# Patient Record
Sex: FEMALE | Race: WHITE
[De-identification: names, ages, dates, MRNs, and addresses within clinical notes are randomized per-mention and may not be internally consistent; named-entity substitution may affect disease eponyms.]

---

## 2019-02-01 PROBLEM — Z00.00 ENCOUNTER FOR PREVENTIVE HEALTH EXAMINATION: Status: ACTIVE | Noted: 2019-02-01

## 2019-02-06 ENCOUNTER — APPOINTMENT (OUTPATIENT)
Dept: ANTEPARTUM | Facility: CLINIC | Age: 44
End: 2019-02-06

## 2019-02-06 ENCOUNTER — OUTPATIENT (OUTPATIENT)
Dept: OUTPATIENT SERVICES | Facility: HOSPITAL | Age: 44
LOS: 1 days | Discharge: HOME | End: 2019-02-06

## 2019-02-06 VITALS
HEART RATE: 92 BPM | OXYGEN SATURATION: 97 % | DIASTOLIC BLOOD PRESSURE: 71 MMHG | SYSTOLIC BLOOD PRESSURE: 140 MMHG | HEIGHT: 68 IN | BODY MASS INDEX: 28.04 KG/M2 | TEMPERATURE: 98.3 F | WEIGHT: 185 LBS

## 2019-02-06 DIAGNOSIS — Z82.5 FAMILY HISTORY OF ASTHMA AND OTHER CHRONIC LOWER RESPIRATORY DISEASES: ICD-10-CM

## 2019-02-06 DIAGNOSIS — Z78.9 OTHER SPECIFIED HEALTH STATUS: ICD-10-CM

## 2019-02-06 RX ORDER — CHLORHEXIDINE GLUCONATE 4 %
400 LIQUID (ML) TOPICAL
Refills: 0 | Status: ACTIVE | COMMUNITY

## 2019-02-06 NOTE — DISCUSSION/SUMMARY
[FreeTextEntry1] : Ms. Galaviz is a 43 year old  @ 14 weeks 2 days, donor egg pregnancy with an abnormal DOwn Syndrome screen (1:12) on the first part of the Sequential screen) and NIPS low risk for common aneuploidies.\par \par Please note this consult only addresses this risk above and does not address any other genetic issues such as advanced paternal age or expanded carrier screening (they can be addressed by a genetic counselor if desired).\par \par We discussed genes and chromosomes.  We discussed some of the findings of Down Syndrome, including but not limited to intellectual disability, obesity, heart defects, unique facial features.  We also briefly discussed trisomy 18 and trisomy 13.  These babies are usually sicker than babies with Down syndrome, have intellectual disability, difficulty breathing, and congenital defects.  Most babies with these two trisomies will not live beyond a year of life.  Some will die within a week, and some die in utero. \par \par  We discussed the risk of aneuploidy from the maternal aspect is that of the donor, not Ms. Galaviz herself.  We discussed the Sequential Screen.  The sequential screens takes the patient's age, the thickness behind the skin of the baby's neck, and levels of protein in the blood done between 11 - 14 weeks gestation, and combines them with other levels of specific proteins in the blood obtained during the early part of the second trimester.  She receives a risk of the baby having Down syndrome or trisomy 18.  This is a screening test (does not say yes or no with 100% certainty) and the results may be wrong.  A positive result does not mean the the baby has Down syndrome or Trisomy 18 and a negative result does not exclude these conditions with 100% certainty.\par \par We discussed cell free fetal DNA screening, which is also a blood test that looks for the fetal DNA in the maternal circulation.  (I confirmed with [employer] that their NIPS can be used with donor egg pregnancies.) This test is more accurate than the sequential screen above when screening for Down syndrome, Trisomy 18, or Trisomy 13.  It can tell the genetic sex of the baby and also screens for abnormalities in the sex chromosomes.  Here too, however, this is a screening test only.  A positive result does not mean the baby for sure has the disease and a negative result does not guarantee that the baby does not have the disease, though it is highly unlikely that the baby has the disease.   If a patient has cell free fetal DNA screening for common aneuploidies we recommend that the patient have maternal serum alpha fetoprotein drawn ideally between 16 - 18 weeks to screen for open neural tube defects.\par \par We discussed the discrepancy between these two tests.  We discussed that the positive predictive value of the first part of the Sequential Screen is < 5% for someone less than 35 years of age. We discussed that the negative predictive value for Down syndrome of low risk NIPS is > 99%.  For Down syndrome screening, NIPS is more accurate than the Sequential screen.  Occasionally a Sequential screen is positive for Down syndrome, not because the baby has Down syndrome, but because the baby has another type of aneuploidy.  \par \par We discussed that while the NIPS results are overall reassuring, there is still a risk of a false negative, and the NIPS only looks at a few chromosomal imbalances.   We discussed that the only way to know the number of chromosomes a baby has is to do an amniocentesis (or a CVS).  There is a risk of miscarriage of around 1/400 from an amniocentesis even if the baby does not have a chromosomal imbalance.  There are other risks as well, including bleeding, infection, leakage of fluid.  We discussed that if a patient will consider termination of a pregnancy if the baby would be diagnosed with these condition (or any other chromosomal abnormality) or wanted to know the chromosomal makeup of the baby with almost 100% certainty, then amniocentesis should be considered.   We discussed that a normal amniocentesis does not guarantee the birth of a healthy baby.  She will let Dr. Tovar know if she wants an amniocentesis.  She knows that is usually is not done before 15 - 16 weeks gestation but she should make a decision within the next few weeks because it takes a few weeks to get results from the amniocentesis. \par \par In any event we recommend a fetal echocardiogram be performed in all patients who have IVF.  Daily low dose aspirin can be considered to reduce the risk of aneuploidy (she is 43 years of age, and at least today her blood pressure was elevated.  The blood pressure should be monitored.)\par \par The obstetric risks of advanced maternal age were discussed, including but not limited to the increased risks gestational hypertension, preeclampsia, gestational diabetes,  labor,  morbidity, placental complications such as abruption and previa, delivery via  and dysfunctional labor, and other maternal morbidity/mortality. \par \par Prenatal care is with Dr. Tovar.\par \par Ms. Galaviz and her partner expressed understanding and all of their questions were answered to their satisfaction. Thank you for this consult.\par \par Marko Meneses MD\par \par \par \par \par \par \par \par \par

## 2019-02-06 NOTE — HISTORY OF PRESENT ILLNESS
[FreeTextEntry1] : Ms. Galaviz is a 43 year old  @ 14 weeks 2 days (assuming a day 5 transfer, date of transfer was 2018. ) This is a donor egg pregnancy (donor was born in ), performed at [facility].  The sperm is of Ms. Galaviz's partner, who is 48 years old She is unsure whether preimplantation genetic screening was performed.\par \par She tried getting pregnant for two years prior to proceeding with the donor egg pregnancy. \par \par She has had no issues this pregnancy except for mild nausea.\par \par Ms. Galaviz had noninvasive prenatal screening done which was ,low risk for common aneuploidies.  However, she also had the first part of the Sequential screen, which reveal a risk of Down syndrome of 1:12. She was therefore referred by Dr. Tovar for consultation.  The patient's primary language is Egyptian.  However she declined the use of the  phone and she said she understood what I said. [Patient travelled to an area with active Zika Virus transmission during pregnancy or 8 weeks before getting pregnant] : Patient stated she did not travel to an area with active Zika virus transmission during pregnancy or 8 weeks before getting pregnant [Patient had sex without a condom with a man who traveled to, or reside in, an area with active Zika Virus transmission during pregnancy] : Patient did not have sex without a condom with a man who traveled to, or reside in, an area with active Zika Virus transmission during pregnancy

## 2019-02-06 NOTE — PHYSICAL EXAM
[Examination Of The Lungs] : normal [Examination Of The Cardiovascular System] : normal [Examination Of The Abdomen] : normal [Peripheral Vascular Exam] : normal [FreeTextEntry1] : Declined to give a urine sample.

## 2019-02-06 NOTE — SURGICAL HISTORY
[Breast Disease] : breast disease [Infertility] : infertility [Fibroids] : no fibroids [Abn Paps] : no abnormal pap smears [STI's] : no STI's [Cysts] : no cysts [OC Use] : no OC use [de-identified] : breast cyst (already evaluated per the patient)

## 2019-03-22 ENCOUNTER — OUTPATIENT (OUTPATIENT)
Dept: OUTPATIENT SERVICES | Facility: HOSPITAL | Age: 44
LOS: 1 days | Discharge: HOME | End: 2019-03-22

## 2019-03-22 ENCOUNTER — APPOINTMENT (OUTPATIENT)
Dept: ANTEPARTUM | Facility: CLINIC | Age: 44
End: 2019-03-22

## 2019-06-14 ENCOUNTER — INPATIENT (INPATIENT)
Facility: HOSPITAL | Age: 44
LOS: 10 days | Discharge: HOME | End: 2019-06-25
Attending: OBSTETRICS & GYNECOLOGY | Admitting: OBSTETRICS & GYNECOLOGY
Payer: COMMERCIAL

## 2019-06-14 VITALS — TEMPERATURE: 99 F

## 2019-06-14 DIAGNOSIS — Z98.890 OTHER SPECIFIED POSTPROCEDURAL STATES: Chronic | ICD-10-CM

## 2019-06-14 LAB
ALBUMIN SERPL ELPH-MCNC: 2.9 G/DL — LOW (ref 3.5–5.2)
ALP SERPL-CCNC: 94 U/L — SIGNIFICANT CHANGE UP (ref 30–115)
ALT FLD-CCNC: 23 U/L — SIGNIFICANT CHANGE UP (ref 0–41)
ANION GAP SERPL CALC-SCNC: 12 MMOL/L — SIGNIFICANT CHANGE UP (ref 7–14)
APPEARANCE UR: CLEAR — SIGNIFICANT CHANGE UP
AST SERPL-CCNC: 35 U/L — SIGNIFICANT CHANGE UP (ref 0–41)
BASOPHILS # BLD AUTO: 0.03 K/UL — SIGNIFICANT CHANGE UP (ref 0–0.2)
BASOPHILS NFR BLD AUTO: 0.2 % — SIGNIFICANT CHANGE UP (ref 0–1)
BILIRUB SERPL-MCNC: 0.2 MG/DL — SIGNIFICANT CHANGE UP (ref 0.2–1.2)
BILIRUB UR-MCNC: NEGATIVE — SIGNIFICANT CHANGE UP
BUN SERPL-MCNC: 10 MG/DL — SIGNIFICANT CHANGE UP (ref 10–20)
C TRACH RRNA SPEC QL NAA+PROBE: SIGNIFICANT CHANGE UP
CALCIUM SERPL-MCNC: 9.1 MG/DL — SIGNIFICANT CHANGE UP (ref 8.5–10.1)
CHLORIDE SERPL-SCNC: 106 MMOL/L — SIGNIFICANT CHANGE UP (ref 98–110)
CO2 SERPL-SCNC: 22 MMOL/L — SIGNIFICANT CHANGE UP (ref 17–32)
COLOR SPEC: YELLOW — SIGNIFICANT CHANGE UP
CREAT ?TM UR-MCNC: 30 MG/DL — SIGNIFICANT CHANGE UP
CREAT SERPL-MCNC: 0.7 MG/DL — SIGNIFICANT CHANGE UP (ref 0.7–1.5)
DIFF PNL FLD: NEGATIVE — SIGNIFICANT CHANGE UP
EOSINOPHIL # BLD AUTO: 0.11 K/UL — SIGNIFICANT CHANGE UP (ref 0–0.7)
EOSINOPHIL NFR BLD AUTO: 0.8 % — SIGNIFICANT CHANGE UP (ref 0–8)
GLUCOSE SERPL-MCNC: 77 MG/DL — SIGNIFICANT CHANGE UP (ref 70–99)
GLUCOSE UR QL: NEGATIVE MG/DL — SIGNIFICANT CHANGE UP
HCT VFR BLD CALC: 35.9 % — LOW (ref 37–47)
HGB BLD-MCNC: 11.6 G/DL — LOW (ref 12–16)
IMM GRANULOCYTES NFR BLD AUTO: 0.7 % — HIGH (ref 0.1–0.3)
KETONES UR-MCNC: NEGATIVE — SIGNIFICANT CHANGE UP
LDH SERPL L TO P-CCNC: 236 — SIGNIFICANT CHANGE UP (ref 50–242)
LEUKOCYTE ESTERASE UR-ACNC: NEGATIVE — SIGNIFICANT CHANGE UP
LYMPHOCYTES # BLD AUTO: 17.5 % — LOW (ref 20.5–51.1)
LYMPHOCYTES # BLD AUTO: 2.28 K/UL — SIGNIFICANT CHANGE UP (ref 1.2–3.4)
MCHC RBC-ENTMCNC: 29.4 PG — SIGNIFICANT CHANGE UP (ref 27–31)
MCHC RBC-ENTMCNC: 32.3 G/DL — SIGNIFICANT CHANGE UP (ref 32–37)
MCV RBC AUTO: 90.9 FL — SIGNIFICANT CHANGE UP (ref 81–99)
MONOCYTES # BLD AUTO: 1.03 K/UL — HIGH (ref 0.1–0.6)
MONOCYTES NFR BLD AUTO: 7.9 % — SIGNIFICANT CHANGE UP (ref 1.7–9.3)
N GONORRHOEA RRNA SPEC QL NAA+PROBE: SIGNIFICANT CHANGE UP
NEUTROPHILS # BLD AUTO: 9.49 K/UL — HIGH (ref 1.4–6.5)
NEUTROPHILS NFR BLD AUTO: 72.9 % — SIGNIFICANT CHANGE UP (ref 42.2–75.2)
NITRITE UR-MCNC: NEGATIVE — SIGNIFICANT CHANGE UP
NRBC # BLD: 0 /100 WBCS — SIGNIFICANT CHANGE UP (ref 0–0)
PH UR: 7 — SIGNIFICANT CHANGE UP (ref 5–8)
PLATELET # BLD AUTO: 199 K/UL — SIGNIFICANT CHANGE UP (ref 130–400)
POTASSIUM SERPL-MCNC: 4.7 MMOL/L — SIGNIFICANT CHANGE UP (ref 3.5–5)
POTASSIUM SERPL-SCNC: 4.7 MMOL/L — SIGNIFICANT CHANGE UP (ref 3.5–5)
PROT ?TM UR-MCNC: 11 MG/DLG/24H — SIGNIFICANT CHANGE UP
PROT SERPL-MCNC: 5.8 G/DL — LOW (ref 6–8)
PROT UR-MCNC: NEGATIVE MG/DL — SIGNIFICANT CHANGE UP
PROT/CREAT UR-RTO: 0.4 RATIO — HIGH (ref 0–0.2)
RBC # BLD: 3.95 M/UL — LOW (ref 4.2–5.4)
RBC # FLD: 13.5 % — SIGNIFICANT CHANGE UP (ref 11.5–14.5)
SODIUM SERPL-SCNC: 140 MMOL/L — SIGNIFICANT CHANGE UP (ref 135–146)
SP GR SPEC: 1.01 — SIGNIFICANT CHANGE UP (ref 1.01–1.03)
SPECIMEN SOURCE: SIGNIFICANT CHANGE UP
URATE SERPL-MCNC: 6.1 MG/DL — SIGNIFICANT CHANGE UP (ref 2.5–7)
UROBILINOGEN FLD QL: 0.2 MG/DL — SIGNIFICANT CHANGE UP (ref 0.2–0.2)
WBC # BLD: 13.03 K/UL — HIGH (ref 4.8–10.8)
WBC # FLD AUTO: 13.03 K/UL — HIGH (ref 4.8–10.8)

## 2019-06-14 PROCEDURE — 76820 UMBILICAL ARTERY ECHO: CPT | Mod: 26

## 2019-06-14 PROCEDURE — 76816 OB US FOLLOW-UP PER FETUS: CPT | Mod: 26

## 2019-06-14 PROCEDURE — 93976 VASCULAR STUDY: CPT | Mod: 26

## 2019-06-14 PROCEDURE — 99222 1ST HOSP IP/OBS MODERATE 55: CPT

## 2019-06-14 RX ORDER — SODIUM CHLORIDE 9 MG/ML
1000 INJECTION, SOLUTION INTRAVENOUS
Refills: 0 | Status: DISCONTINUED | OUTPATIENT
Start: 2019-06-14 | End: 2019-06-15

## 2019-06-14 RX ORDER — SODIUM CHLORIDE 9 MG/ML
1000 INJECTION, SOLUTION INTRAVENOUS ONCE
Refills: 0 | Status: DISCONTINUED | OUTPATIENT
Start: 2019-06-14 | End: 2019-06-15

## 2019-06-14 RX ORDER — ACYCLOVIR SODIUM 500 MG
400 VIAL (EA) INTRAVENOUS THREE TIMES A DAY
Refills: 0 | Status: DISCONTINUED | OUTPATIENT
Start: 2019-06-14 | End: 2019-06-21

## 2019-06-14 RX ADMIN — Medication 400 MILLIGRAM(S): at 21:45

## 2019-06-14 RX ADMIN — Medication 400 MILLIGRAM(S): at 14:50

## 2019-06-14 NOTE — OB PROVIDER H&P - ASSESSMENT
44 y/o  at 32w6d GA, GBS unknown, IVF pregnancy 5 day transfer with donor egg, AMA, increased risk of trisomy 21 on sequential I with low risk NIPS, gHTN vs cHTN, with labile BPs and 3 isolated BPs in severe range, for prolonged BP monitoring, r/o  labor.     # gestational HTN vs chronic hypertension  - BPs q15 min.  - Hd 3 isolated severe range BPs but BPs very labile. Will not consider severe gestational hypertension for now.  - 24 hour protein collection started today at 0700  - f/u Upr/cr  - watch for preeclamptic symptoms    # r/o  labor  - regular uterine contractions, felt irregularly by patient  - c/w IV fluids  - on last exam closed  - reccheck if patient feels contractions regularly    #AMA  - increased risk of trisomy 21 on sequential I  - low risk NIPS  - patient refused diagnostic testing    # positive serology for HSV1 and HSV2 IgG  - h/o genital and oral herpes  - no prodromic symptoms or lesions noted by patient  - will start suppression due to regular contractions    # borderline EFW  - EFW on admission 13%ile  - no prior sonograms to compare to  - obtain previous sonograms from PMD  - will do official sonogram today for EFW, BPP and dopplers    # pregnancy  - continuous EFM and toco  - f/u GBS, GC/Cl    Plan discussed with [facility] staff, Dr. Meneses.  Dr. Kenney aware. Dr. Tovar to be made aware.

## 2019-06-14 NOTE — CONSULT NOTE ADULT - SUBJECTIVE AND OBJECTIVE BOX
MFM consult note    HPI: 42 y/o  at 32w6d dated by LMP (10/27/18) c/w first trimester sonogram with EDC 8/3/19, IVF pregnancy with donor egg (donor year of birth ) with partner's sperm, 5 day transfer, presents to the hospital for elevated BP. Patient reports normal blood pressures prior to the pregnancy although in a 14 week visit at the high risk clinic she had an elevated BP of 140/71. Patient measures her BPs at home multiple times a day. Usually BP is 130s/80s. Today at 0100 patient woke up and measured a BP of 150s/80s. Denies headache, blurry vision, chest pain, SOB, epigastric pain. Reports increased swelling in bilateral upper and lower extremities and the face in the past 2 weeks. Gained 5 lbs in the past 2 weeks. Reports irregular contractions. Denies vaginal bleeding and LOF. Feels good fetal movements. Patient is AMA, had increased risk of trisomy 21 on sequential I of 1:12. Patient went for consult with high risk and was offered NIPS vs diagnostic testing (CVS vs amniocentesis). Patient opted for NIPS which came back as low risk for aneuploidy and did not want to have diagnostic testing. No other complications during this preganncy.    PMHx;  chornic hypertension?    PSH; diagnostic ovarian cystectomy (unsure of laterality)    Meds: None    NKDA    Social Hx: Denies x 3    Family Hx: Denies    Ob HX: NSVDX1, full term, no complications, 3115 grams (). SABX1 with no D&C    Gyn Hx: h/o lap ovarian cystectomy (benign). Denies fibroids, abnormal PAP smears and STDs.    PE:  Vital Signs Last 24 Hrs  T(C): 37.1 (2019 03:20), Max: 37.1 (2019 03:06)  T(F): 98.7 (2019 03:20), Max: 98.7 (2019 03:06)  HR: 72 (2019 06:15) (53 - 77)  BP: 174/95 (2019 06:15) (137/85 - 174/95)  BP(mean): --  RR: 16 (2019 03:20) (16 - 16)  SpO2: --    GEN: NAD, AAOX3  CVS: RRR, normal S1/S2  lungs: CTAB  abd: soft, gravid, nontender, mildly palpable ctx  SVE: deferred, last exam @0500 c/l/p  ext: +1 edema in b/l LE, no calf tenderness    EFM: 135/mod/accels+  toco: q2-4 min.    labs:                        11.6   13.03 )-----------( 199      ( 2019 05:40 )             35.9       CMP, uric acid, LDH, UA, Upr/cr, GBS, GC/Cl pending    meds:  None MFM consult note    HPI: 44 y/o  at 32w6d dated by LMP (10/27/18) c/w first trimester sonogram with EDC 8/3/19, IVF pregnancy with donor egg (donor year of birth ) with partner's sperm, 5 day transfer, presents to the hospital for elevated BP. Patient reports normal blood pressures prior to the pregnancy although in a 14 week visit at the high risk clinic she had an elevated BP of 140/71. Patient measured her BPs at home in the past several days "because it was hot". The BPs she measured were 130s-140s/80s. Today at 0100 patient woke up because her phone rang, measured a BP of 150s/80s. Denies headache, blurry vision, chest pain, SOB, epigastric pain. Reports increased swelling in bilateral upper and lower extremities and the face in the past 2 weeks. Gained 5 lbs in the past 2 weeks. Reports irregular contractions. Denies vaginal bleeding and LOF. Feels good fetal movements. Patient is AMA, had increased risk of trisomy 21 on sequential I of 1:12. Patient went for consult with high risk and was offered NIPS vs diagnostic testing (CVS vs amniocentesis). Patient opted for NIPS which came back as low risk for aneuploidy and did not want to have diagnostic testing. No other complications during this preganncy.    PMHx;  chornic hypertension?    PSH; diagnostic ovarian cystectomy (unsure of laterality)    Meds: None    NKDA    Social Hx: Denies x 3    Family Hx: Denies    Ob HX: NSVDX1, full term, no complications, 3115 grams (). SABX1 with no D&C    Gyn Hx: h/o lap ovarian cystectomy (benign). Denies fibroids, abnormal PAP smears and STDs.    PE:  Vital Signs Last 24 Hrs  T(C): 37.1 (2019 03:20), Max: 37.1 (2019 03:06)  T(F): 98.7 (2019 03:20), Max: 98.7 (2019 03:06)  HR: 72 (2019 06:15) (53 - 77)  BP: 174/95 (2019 06:15) (137/85 - 174/95)  BP(mean): --  RR: 16 (2019 03:20) (16 - 16)  SpO2: --    GEN: NAD, AAOX3  CVS: RRR, normal S1/S2  lungs: CTAB  abd: soft, gravid, nontender, mildly palpable ctx  SVE: deferred, last exam @0500 c/l/p  ext: +1 edema in b/l LE, no calf tenderness    EFM: 135/mod/accels+  toco: q2-4 min.    labs:                        11.6   13.03 )-----------( 199      ( 2019 05:40 )             35.9       CMP, uric acid, LDH, UA, Upr/cr, GBS, GC/Cl pending    meds:  None MFM consult note    HPI: 44 y/o  at 32w6d with EDC 8/3/19, IVF pregnancy with donor egg (donor's year of birth ) with partner's sperm, 5 day transfer, presents to the hospital for elevated BP. Patient reports normal blood pressures prior to the pregnancy although in a 14 week visit at the high risk clinic she had an elevated BP of 140/71. Patient measured her BPs at home in the past several days "because it was hot". The BPs she measured were 130s-140s/80s. Today at 0100 patient woke up because her phone rang, measured a BP of 150s/80s. Denies headache, blurry vision, chest pain, SOB, epigastric pain. Reports increased swelling in bilateral upper and lower extremities and the face in the past 2 weeks. Gained 5 lbs in the past 2 weeks. Reports irregular contractions. Denies vaginal bleeding and LOF. Feels good fetal movements. Patient is AMA, had increased risk of trisomy 21 on sequential I of 1:12. Patient went for consult with high risk and was offered NIPS vs diagnostic testing (CVS vs amniocentesis). Patient opted for NIPS which came back as low risk for aneuploidy and did not want to have diagnostic testing. No other complications during this preganncy.    PMHx;  chornic hypertension?    PSH; diagnostic ovarian cystectomy (unsure of laterality)    Meds: None    NKDA    Social Hx: Denies x 3    Family Hx: Denies    Ob HX: NSVDX1, full term, no complications, 3115 grams (). SABX1 with no D&C    Gyn Hx: h/o lap ovarian cystectomy (benign). Denies fibroids, abnormal PAP smears and STDs.    PE:  Vital Signs Last 24 Hrs  T(C): 37.1 (2019 03:20), Max: 37.1 (2019 03:06)  T(F): 98.7 (2019 03:20), Max: 98.7 (2019 03:06)  HR: 72 (2019 06:15) (53 - 77)  BP: 174/95 (2019 06:15) (137/85 - 174/95)  BP(mean): --  RR: 16 (2019 03:20) (16 - 16)  SpO2: --    GEN: NAD, AAOX3  CVS: RRR, normal S1/S2  lungs: CTAB  abd: soft, gravid, nontender, mildly palpable ctx  SVE: deferred, last exam @0500 c/l/p  ext: +1 edema in b/l LE, no calf tenderness    EFM: 135/mod/accels+  toco: q2-4 min.    labs:                        11.6   13.03 )-----------( 199      ( 2019 05:40 )             35.9       CMP, uric acid, LDH, UA, Upr/cr, GBS, GC/Cl pending    meds:  None

## 2019-06-14 NOTE — OB PROVIDER H&P - NSHPPHYSICALEXAM_GEN_ALL_CORE
Vital Signs Last 24 Hrs  T(C): 37 (14 Jun 2019 07:13), Max: 37.1 (14 Jun 2019 03:06)  T(F): 98.6 (14 Jun 2019 07:13), Max: 98.7 (14 Jun 2019 03:06)  HR: 66 (14 Jun 2019 08:58) (52 - 77)  BP: 125/- (14 Jun 2019 09:33) (118/55 - 175/98)  BP(mean): --  RR: 16 (14 Jun 2019 07:13) (16 - 16)  SpO2: --    GEN: NAD, AAOX3  CVS: RRR, normal S1/S2  lungs: CTAB  abd: soft, gravid, nontender, mildly palpable ctx  SVE: deferred, last exam @0500 c/l/p  ext: +1 edema in b/l LE, no calf tenderness    EFM: 135/mod/accels+  toco: q2-4 min.

## 2019-06-14 NOTE — CONSULT NOTE ADULT - ASSESSMENT
44 y/o  at 32w6d GA, GBS unknown, IVF pregnancy 5 day transfer with donor egg, AMA, increased risk of trisomy 21 on sequential I with low risk NIPS, likely chronic hypertension r/o superimposed preeclampsia, with 2 isolated severe range BPs, for prolonged BP monitoring, r/o  labor.     # likely cHTN r/o superimposed preeclampsia  - BPs q15 min.  - if 2 sequential severe BPs or severe BPs 4 hours apart will diagnose superimposed preeclampsia with severe features and will then start magnesium  - 24 hour protein collection  - f/u rest of preeclamptic labs  - watch for preeclamptic symptoms    # r/o  labor  - regular uterine contractions, only partially felt by patient  - c/w IV fluids  - on last exam closed  - reccheck in several hours    #AMA  - increased risk of trisomy 21 on sequential I  - low risk NIPS  - patient refused diagnostic testing    # positive serology for HSV1 and HSV2 IgG  - no prodromic symptoms  - will need suppression from 36 weeks    # pregnancy  - continuous EFM and toco  - f/u GBS, GC/Cl    Will discuss with Dr. Meneses. 42 y/o  at 32w6d GA, GBS unknown, IVF pregnancy 5 day transfer with donor egg, AMA, increased risk of trisomy 21 on sequential I with low risk NIPS, PIH vs chronic hypertension, with 2 isolated severe range BPs but not meeting criteria, for prolonged BP monitoring, r/o  labor.     # PIH vs chronic hypertension  - BPs q15 min.  - if has another elevated BP after 0800 will meet criteria for gestational hypertension  - if continues to have consistent severe BPs will consider starting magnesium for severe gestational hypertension vs cHTN with superimposed preeclampsia with severe features (worsening BPs)  - 24 hour protein collection  - f/u rest of preeclamptic labs  - watch for preeclamptic symptoms    # r/o  labor  - regular uterine contractions, only partially felt by patient  - c/w IV fluids  - on last exam closed  - reccheck in several hours    #AMA  - increased risk of trisomy 21 on sequential I  - low risk NIPS  - patient refused diagnostic testing    # positive serology for HSV1 and HSV2 IgG  - h/o genital and oral herpes  - no prodromic symptoms  - will start suppression due to regular contractions    # borderline EFW  - EFW on admission 13%ile  - no prior sonograms to compare to  - obtain previous sonograms from PMD  - will do official sonogram today for EFW, BPP and dopplers    # pregnancy  - continuous EFM and toco  - f/u GBS, GC/Cl    Will discuss with Dr. Meneses. 44 y/o  at 32w6d GA, GBS unknown, IVF pregnancy 5 day transfer with donor egg, AMA, increased risk of trisomy 21 on sequential I with low risk NIPS, PIH vs chronic hypertension, did not meet yet BP criteria, 2 isolated severe BPs, for prolonged BP monitoring, r/o  labor.     # PIH vs chronic hypertension  - BPs q15 min.  - if has another elevated BP after 0800 will meet criteria for gestational hypertension  - if continues to have consistent severe BPs will consider starting magnesium for severe gestational hypertension  - 24 hour protein collection  - f/u rest of preeclamptic labs  - watch for preeclamptic symptoms    # r/o  labor  - regular uterine contractions, only partially felt by patient  - c/w IV fluids  - on last exam closed  - recheck in several hours    #AMA  - increased risk of trisomy 21 on sequential I  - low risk NIPS  - patient refused diagnostic testing    # positive serology for HSV1 and HSV2 IgG  - h/o genital and oral herpes  - no prodromic symptoms  - will start suppression due to regular contractions    # borderline EFW  - EFW on admission 13%ile  - no prior sonograms to compare to  - obtain previous sonograms from PMD  - will do official sonogram today for EFW, BPP and dopplers    # pregnancy  - continuous EFM and toco  - f/u GBS, GC/Cl    Will discuss with Dr. Meneses.

## 2019-06-14 NOTE — OB PROVIDER H&P - NS_OBGYNHISTORY_OBGYN_ALL_OB_FT
Ob HX: NSVDX1, full term, no complications, 3115 grams (1994). SABX1 with no D&C    Gyn Hx: h/o lap ovarian cystectomy (benign). Denies fibroids, abnormal PAP smears and STDs.

## 2019-06-14 NOTE — OB PROVIDER TRIAGE NOTE - NSHPPHYSICALEXAM_GEN_ALL_CORE
Vital Signs Last 24 Hrs  T(F): 98.7 (14 Jun 2019 03:20), Max: 98.7 (14 Jun 2019 03:06)  HR: 54 (14 Jun 2019 05:28) (54 - 77)  BP: 145/85 (14 Jun 2019 05:28) (137/85 - 168/87)  RR: 16 (14 Jun 2019 03:20) (16 - 16)    udip: neg    gen: AAOx3, nad  EFM: 140/mod malcolm/+accel  toco: q3-4min  SVE: C/L/P  card: rrr, no m/r/g  resp: cta b/l, no r/r/w  abd: soft, nontender, gravid, palpable contractions, no RUQ/epigastric tenderness  ext: 1+ b/l LE edema, no LE tenderness  speculum: no discharge or bleeding  TVUS: cvx 3.5cm  TAUS: vtx, post placenta, vtx, MVp 5.9cm, BPP 8/8 1822gms (13%) Vital Signs Last 24 Hrs  T(F): 98.7 (14 Jun 2019 03:20), Max: 98.7 (14 Jun 2019 03:06)  HR: 54 (14 Jun 2019 05:28) (54 - 77)  BP: 145/85 (14 Jun 2019 05:28) (137/85 - 168/87)  RR: 16 (14 Jun 2019 03:20) (16 - 16)    udip: neg    gen: AAOx3, nad  EFM: 140/mod malcolm/+accel  toco: q3-4min  SVE: C/L/P  card: rrr, no m/r/g  resp: cta b/l, no r/r/w  abd: soft, nontender, gravid, palpable contractions, no RUQ/epigastric tenderness  ext: 1+ b/l LE edema, no LE tenderness  speculum: no discharge or bleeding  TVUS: cvx 3.2cm  TAUS: vtx, post placenta, vtx, MVp 5.9cm, BPP 8/8 1822gms (13%)

## 2019-06-14 NOTE — OB PROVIDER H&P - NSHPLABSRESULTS_GEN_ALL_CORE
sequential I increased risk for trisomy 21 1:12  NIPS low risk  HSV1 and HSV2 IgG positive  normal hemoglobin electrophoresis  HCV NR    Labs from current admission:                        11.6   13.03 )-----------( 199      ( 2019 05:40 )             35.9     06-14    140  |  106  |  10  ----------------------------<  77  4.7   |  22  |  0.7    Ca    9.1      2019 05:40    TPro  5.8<L>  /  Alb  2.9<L>  /  TBili  0.2  /  DBili  x   /  AST  35  /  ALT  23  /  AlkPhos  94  06-14    uric acid 6.1,     Urinalysis Basic - ( 2019 03:05 )    Color: Yellow / Appearance: Clear / S.010 / pH: x  Gluc: x / Ketone: Negative  / Bili: Negative / Urobili: 0.2 mg/dL   Blood: x / Protein: Negative mg/dL / Nitrite: Negative   Leuk Esterase: Negative / RBC: x / WBC x   Sq Epi: x / Non Sq Epi: x / Bacteria: x

## 2019-06-14 NOTE — OB PROVIDER H&P - HISTORY OF PRESENT ILLNESS
42 y/o  at 32w6d with EDC 8/3/19, IVF pregnancy with donor egg (donor's year of birth ) with partner's sperm, 5 day transfer, presents to the hospital for elevated BP. Patient reports normal blood pressures prior to the pregnancy although in a 14 week visit at the high risk clinic she had an elevated BP of 140/71. Patient measured her BPs at home in the past several days "because it was hot". The BPs she measured were 130s-140s/80s. Today at 0100 patient woke up because her phone rang, measured a BP of 150s/80s. Denies headache, blurry vision, chest pain, SOB, epigastric pain. Reports increased swelling in bilateral upper and lower extremities and the face in the past 2 weeks. Gained 5 lbs in the past 2 weeks. Reports irregular contractions. Denies vaginal bleeding and LOF. Feels good fetal movements. Patient is AMA, had increased risk of trisomy 21 on sequential I of 1:12. Patient went for consult with high risk and was offered NIPS vs diagnostic testing (CVS vs amniocentesis). Patient opted for NIPS which came back as low risk for aneuploidy and did not want to have diagnostic testing. No other complications during this pregnancy.  Patient observed in labor and delivery overnight and now meets criteria for gestational hypertension. Had 3 isolated severe BPs but BPs are labile.

## 2019-06-14 NOTE — PROGRESS NOTE ADULT - SUBJECTIVE AND OBJECTIVE BOX
MFM procedure note    Pregnancy induced hypertension    Non stress test    Date:  6/14/2019  Start:  8:30 AM  End:  9:00 AM    Baseline: 135  beats per minute  Variability:  minimal to moderate  Accelerations: 10 x 10  Decelerations: none    Tocometer: every three to five minutes    Nonreactive non stress test.  WIll do biophysical profile later today.    Marko Meneses MD

## 2019-06-14 NOTE — OB PROVIDER TRIAGE NOTE - NSOBPROVIDERNOTE_OBGYN_ALL_OB_FT
44 yo  @32w6d, GBS unknown, r/o gHTN vs preeclampsia.    - BPs q15min  - IV hydration  - f/up PELs, Upr/cr  - MFM to see    Dr. Butler aware and Dr. Tovar to be made aware.

## 2019-06-14 NOTE — OB PROVIDER TRIAGE NOTE - NS_DISPOSITION_OBGYN_ALL_OB
Continue to Observe
no loss of consciousness, no gait abnormality, no headache, no sensory deficits, and no weakness.

## 2019-06-14 NOTE — OB PROVIDER TRIAGE NOTE - HISTORY OF PRESENT ILLNESS
42 yo  @32w6d w/ EDC 8/3/19 by LMP and c/w  tri sebastian presents to L&D with elevated blood pressure. Pt took blood pressure at home on , noticed it was elevated 140/80. Blood pressure were within normal range (130s/80s) until last night when she once again had an elevated pressure 140/80. Denies any issue with blood pressure prior to . Has also noticed increased swelling in face, hand and feet for the past 2 weeks. Denies fever, chills, HA, blurry vision, CP, SOB, N/V, RUQ/epigastric pain, diarrhea/constipation, dysuria, urgency/frequency, LE pain. Last BM yesterday. Last intercourse weeks ago. Denies ctx, LOF, VB. Reports good fetal movements. No complications during this pregnancy. GBS unknown.

## 2019-06-15 LAB
ALBUMIN SERPL ELPH-MCNC: 2.7 G/DL — LOW (ref 3.5–5.2)
ALP SERPL-CCNC: 81 U/L — SIGNIFICANT CHANGE UP (ref 30–115)
ALT FLD-CCNC: 17 U/L — SIGNIFICANT CHANGE UP (ref 0–41)
ANION GAP SERPL CALC-SCNC: 11 MMOL/L — SIGNIFICANT CHANGE UP (ref 7–14)
AST SERPL-CCNC: 24 U/L — SIGNIFICANT CHANGE UP (ref 0–41)
BASOPHILS # BLD AUTO: 0.05 K/UL — SIGNIFICANT CHANGE UP (ref 0–0.2)
BASOPHILS NFR BLD AUTO: 0.5 % — SIGNIFICANT CHANGE UP (ref 0–1)
BILIRUB SERPL-MCNC: 0.3 MG/DL — SIGNIFICANT CHANGE UP (ref 0.2–1.2)
BUN SERPL-MCNC: 13 MG/DL — SIGNIFICANT CHANGE UP (ref 10–20)
CALCIUM SERPL-MCNC: 8.3 MG/DL — LOW (ref 8.5–10.1)
CHLORIDE SERPL-SCNC: 107 MMOL/L — SIGNIFICANT CHANGE UP (ref 98–110)
CO2 SERPL-SCNC: 21 MMOL/L — SIGNIFICANT CHANGE UP (ref 17–32)
COLLECT DURATION TIME UR: 24 HR — SIGNIFICANT CHANGE UP
CREAT SERPL-MCNC: 0.7 MG/DL — SIGNIFICANT CHANGE UP (ref 0.7–1.5)
EOSINOPHIL # BLD AUTO: 0.17 K/UL — SIGNIFICANT CHANGE UP (ref 0–0.7)
EOSINOPHIL NFR BLD AUTO: 1.5 % — SIGNIFICANT CHANGE UP (ref 0–8)
GLUCOSE SERPL-MCNC: 82 MG/DL — SIGNIFICANT CHANGE UP (ref 70–99)
GROUP B BETA STREP DNA (PCR): DETECTED
GROUP B BETA STREP INTERPRETATION: SIGNIFICANT CHANGE UP
HCT VFR BLD CALC: 33.3 % — LOW (ref 37–47)
HGB BLD-MCNC: 10.8 G/DL — LOW (ref 12–16)
IMM GRANULOCYTES NFR BLD AUTO: 0.6 % — HIGH (ref 0.1–0.3)
LDH SERPL L TO P-CCNC: 212 — SIGNIFICANT CHANGE UP (ref 50–242)
LYMPHOCYTES # BLD AUTO: 1.75 K/UL — SIGNIFICANT CHANGE UP (ref 1.2–3.4)
LYMPHOCYTES # BLD AUTO: 15.8 % — LOW (ref 20.5–51.1)
MCHC RBC-ENTMCNC: 29.3 PG — SIGNIFICANT CHANGE UP (ref 27–31)
MCHC RBC-ENTMCNC: 32.4 G/DL — SIGNIFICANT CHANGE UP (ref 32–37)
MCV RBC AUTO: 90.5 FL — SIGNIFICANT CHANGE UP (ref 81–99)
MONOCYTES # BLD AUTO: 0.85 K/UL — HIGH (ref 0.1–0.6)
MONOCYTES NFR BLD AUTO: 7.7 % — SIGNIFICANT CHANGE UP (ref 1.7–9.3)
NEUTROPHILS # BLD AUTO: 8.18 K/UL — HIGH (ref 1.4–6.5)
NEUTROPHILS NFR BLD AUTO: 73.9 % — SIGNIFICANT CHANGE UP (ref 42.2–75.2)
NRBC # BLD: 0 /100 WBCS — SIGNIFICANT CHANGE UP (ref 0–0)
PLATELET # BLD AUTO: 171 K/UL — SIGNIFICANT CHANGE UP (ref 130–400)
POTASSIUM SERPL-MCNC: 4.3 MMOL/L — SIGNIFICANT CHANGE UP (ref 3.5–5)
POTASSIUM SERPL-SCNC: 4.3 MMOL/L — SIGNIFICANT CHANGE UP (ref 3.5–5)
PROT 24H UR-MRATE: 210 MG/24 H — HIGH (ref 50–100)
PROT SERPL-MCNC: 5.1 G/DL — LOW (ref 6–8)
RBC # BLD: 3.68 M/UL — LOW (ref 4.2–5.4)
RBC # FLD: 13.8 % — SIGNIFICANT CHANGE UP (ref 11.5–14.5)
SODIUM SERPL-SCNC: 139 MMOL/L — SIGNIFICANT CHANGE UP (ref 135–146)
SOURCE GROUP B STREP: SIGNIFICANT CHANGE UP
TOTAL VOLUME - 24 HOUR: 1500 ML — SIGNIFICANT CHANGE UP
URATE SERPL-MCNC: 6.7 MG/DL — SIGNIFICANT CHANGE UP (ref 2.5–7)
URINE CREATININE CALCULATION: 1.4 G/24 HR — SIGNIFICANT CHANGE UP (ref 0.8–1.8)
WBC # BLD: 11.07 K/UL — HIGH (ref 4.8–10.8)
WBC # FLD AUTO: 11.07 K/UL — HIGH (ref 4.8–10.8)

## 2019-06-15 RX ORDER — NIFEDIPINE 30 MG
30 TABLET, EXTENDED RELEASE 24 HR ORAL ONCE
Refills: 0 | Status: COMPLETED | OUTPATIENT
Start: 2019-06-15 | End: 2019-06-15

## 2019-06-15 RX ORDER — NIFEDIPINE 30 MG
30 TABLET, EXTENDED RELEASE 24 HR ORAL DAILY
Refills: 0 | Status: DISCONTINUED | OUTPATIENT
Start: 2019-06-16 | End: 2019-06-20

## 2019-06-15 RX ORDER — SODIUM CHLORIDE 9 MG/ML
500 INJECTION, SOLUTION INTRAVENOUS
Refills: 0 | Status: DISCONTINUED | OUTPATIENT
Start: 2019-06-15 | End: 2019-06-18

## 2019-06-15 RX ADMIN — Medication 30 MILLIGRAM(S): at 13:00

## 2019-06-15 RX ADMIN — Medication 400 MILLIGRAM(S): at 21:51

## 2019-06-15 RX ADMIN — Medication 400 MILLIGRAM(S): at 06:11

## 2019-06-15 RX ADMIN — Medication 400 MILLIGRAM(S): at 14:04

## 2019-06-15 NOTE — PROGRESS NOTE ADULT - SUBJECTIVE AND OBJECTIVE BOX
PGY1 Note    Patient seen at bedside, doing well, no complaints. Denies contractions, LOF and VB. Feels good FM. Denies HA, blurry vision, SOB, chest pain, palpitations, N/V and RUQ/epigastric pain. Only complaint is feeling swollen more and more with the IV fluids.     T(F): 98.6 (06-15 @ 03:30), Max: 98.6 (06-15 @ 03:30)  HR: 56 (06-15 @ 08:35)  BP: 126/72 (06-15 @ 08:35) (119/75 - 171/71)  RR: 17 ( @ 19:05)    Gen: AAOx3, NAD  Heart: RRR, no extra heart sounds/murmurs  Resp: lungs are clear to auscultation bilaterally  Abd: NT, gravid, no palpable contractions, no RUQ/epigastric tenderness  Neuro: UE reflexes 2+ bilaterally  Extremities: no calf tenderness bilaterally, nonpitting edema of upper and lower extremities    EFM: 130/mod/accel+  TOCO: occasional  SVE: deferred, last exam at 0457 C/L/P     Medications:  acyclovir Oral Tab/Cap: 400 (06-15 @ 06:11),  400 ( @ 21:45),  400 ( @ 14:50)    Labs:                        10.8   11.07 )-----------( 171      ( 15 Gelacio 2019 04:30 )             33.3     06-15    139  |  107  |  13  ----------------------------<  82  4.3   |  21  |  0.7    Ca    8.3<L>      15 Gelacio 2019 04:30    TPro  5.1<L>  /  Alb  2.7<L>  /  TBili  0.3  /  DBili  x   /  AST  24  /  ALT  17  /  AlkPhos  81  06-15    Uric Acid, Serum: 6.7 mg/dL (06-15 @ 04:30)  Urinalysis Basic - ( 2019 03:05 )  Color: Yellow / Appearance: Clear / S.010 / pH: x  Gluc: x / Ketone: Negative  / Bili: Negative / Urobili: 0.2 mg/dL   Blood: x / Protein: Negative mg/dL / Nitrite: Negative   Leuk Esterase: Negative / RBC: x / WBC x   Sq Epi: x / Non Sq Epi: x / Bacteria: x    UPr/Cr 0.4    A/P:   44 y/o  at 33w GA, GBS unknown, IVF pregnancy 5 day transfer with donor egg, AMA, increased risk of trisomy 21 on sequential I with low risk NIPS, w/ genital herpes, on acyclovir for ppx, w/ preeclampsia w/o severe features, for prolonged BP monitoring, r/o  labor, doing well,     -pain management prn  -cont efm/toco  -f/u pending labs (GBS and total urine protein)  -cont to monitor vitals - BPs q15min  -acyclovir for herpes ppx    Dr. Colmenares and Dr. Tovar to be made aware. PGY1 Note    Patient seen at bedside, doing well, no complaints. Denies contractions, LOF and VB. Feels good FM. Denies HA, blurry vision, SOB, chest pain, palpitations, N/V and RUQ/epigastric pain. Only complaint is feeling swollen more and more with the IV fluids.     T(F): 98.6 (06-15 @ 03:30), Max: 98.6 (06-15 @ 03:30)  HR: 56 (06-15 @ 08:35)  BP: 126/72 (06-15 @ 08:35) (119/75 - 171/71)  RR: 17 ( @ 19:05)    Gen: AAOx3, NAD  Heart: RRR, no extra heart sounds/murmurs  Resp: lungs are clear to auscultation bilaterally  Abd: NT, gravid, no palpable contractions, no RUQ/epigastric tenderness  Neuro: UE reflexes 2+ bilaterally  Extremities: no calf tenderness bilaterally, nonpitting edema of upper and lower extremities    EFM: 130/mod/accel+  TOCO: occasional  SVE: deferred, last exam at 0457 C/L/P     Medications:  acyclovir Oral Tab/Cap: 400 (06-15 @ 06:11),  400 ( @ 21:45),  400 ( @ 14:50)    Labs:                        10.8   11.07 )-----------( 171      ( 15 Gelacio 2019 04:30 )             33.3     06-15    139  |  107  |  13  ----------------------------<  82  4.3   |  21  |  0.7    Ca    8.3<L>      15 Gelacio 2019 04:30    TPro  5.1<L>  /  Alb  2.7<L>  /  TBili  0.3  /  DBili  x   /  AST  24  /  ALT  17  /  AlkPhos  81  06-15    Uric Acid, Serum: 6.7 mg/dL (06-15 @ 04:30)  Urinalysis Basic - ( 2019 03:05 )  Color: Yellow / Appearance: Clear / S.010 / pH: x  Gluc: x / Ketone: Negative  / Bili: Negative / Urobili: 0.2 mg/dL   Blood: x / Protein: Negative mg/dL / Nitrite: Negative   Leuk Esterase: Negative / RBC: x / WBC x   Sq Epi: x / Non Sq Epi: x / Bacteria: x    UPr/Cr 0.4    A/P:   42 y/o  at 33w GA, GBS unknown, IVF pregnancy 5 day transfer with donor egg, AMA, increased risk of trisomy 21 on sequential I with low risk NIPS, w/ genital herpes, on acyclovir for ppx, w/ preeclampsia w/o severe features, w/ elevated BPs, isolated ones in the severe, no preeclamptic symptoms, for prolonged BP monitoring, r/o  labor, doing well,     -pain management prn  -cont efm/toco  -f/u pending labs (GBS and total urine protein)  -cont to monitor vitals - BPs q15min  -acyclovir for herpes ppx    Dr. Colmenares and Dr. Tovar to be made aware.

## 2019-06-15 NOTE — CHART NOTE - NSCHARTNOTEFT_GEN_A_CORE
[facility] PGY3 Note:    6/15 BP range: 116-171/58-91  @0306 160/71 --> repeat 139/83; @0742 171/71-> 130/80    6/15: 11>10.8/33.3<171; 139/4.3/107/21/13/0.7<82; AST/ALT 24/17; uric acid 6.7;     24hr     Discussed with MFM- keep on L&D for continuous monitoring until 24hrs after last severe BP (6/16 0742).   - Start Procardia 30xl for BP management. No mag for now, as no plan for delivery at this time.  - c/w BPs q15min   - SCDs for DVT prophylaxis   - regular diet     Dr. Zafar aware; Dr. Tovar to be made aware.

## 2019-06-16 RX ORDER — ACETAMINOPHEN 500 MG
650 TABLET ORAL ONCE
Refills: 0 | Status: COMPLETED | OUTPATIENT
Start: 2019-06-16 | End: 2019-06-16

## 2019-06-16 RX ADMIN — Medication 650 MILLIGRAM(S): at 02:06

## 2019-06-16 RX ADMIN — Medication 650 MILLIGRAM(S): at 11:40

## 2019-06-16 RX ADMIN — Medication 30 MILLIGRAM(S): at 08:02

## 2019-06-16 RX ADMIN — Medication 400 MILLIGRAM(S): at 22:08

## 2019-06-16 RX ADMIN — Medication 400 MILLIGRAM(S): at 06:14

## 2019-06-16 RX ADMIN — Medication 400 MILLIGRAM(S): at 14:19

## 2019-06-16 NOTE — PROGRESS NOTE ADULT - ASSESSMENT
42 y/o  at 33w1d GA, GBS positive, IVF pregnancy 5 day transfer with donor egg, AMA with elevated risk of trisomy 21 on sequential 1, increased risk of trisomy 21 on sequential I with low risk NIPS, h/o oral and genital herpes, not in labor, with pre-eclampsia without severe features with well controlled blood pressured on procardia 30XL daily, for inpatient monitoring    - Transfer to 4D  - BPs q4min  - c/w procardia 30 XL daily  - c/w acyclovir suppression  - regular diet  - SCDs when not ambulating  - NST BID    Discussed with Dr. Villanueva and Dr. Tovar

## 2019-06-16 NOTE — PROGRESS NOTE ADULT - SUBJECTIVE AND OBJECTIVE BOX
Chief Complaint: Hypertension management    HPI: Pt doing well, pain well controlled. No overnight events, no acute complaints. She denies headache, vision changes, chest pain, SOB, RUQ pain, or new onset swelling. She has been ambulating and tolerating regular diet without difficulty. She denies cramping, leakage of fluid, or vaginal bleeding. She continues to feel regular fetal movement.    ROS: Denies cardiovascular or respiratory symptoms    PAST MEDICAL & SURGICAL HISTORY:  No pertinent past medical history  H/O ovarian cystectomy: laparoscopic  H/O laparoscopy: for liver cyst removal      Physical Exam  Vital Signs Last 24 Hrs  T(F): 97.7 (2019 06:13), Max: 98.2 (15 Gelacio 2019 19:15)  HR: 78 (2019 08:10) (31 - 78)  BP: 126/81 (2019 08:10) (116/73 - 155/83)  RR: 14    Physical exam:  General - AAOx3, NAD  Heart - S1S2, RRR  Lungs - CTA BL  Abdomen - Gravid, soft, nontender, no palpable contractions, no RUQ tenderness  Pelvis/Vagina - No bleeding  Extremities - No calf tenderness, no swelling    Labs:  GBS positive  GC/CT negative    Meds:  Procardia 30XL daily  Acyclovir   400mg TID    Imagin/14 Official Sonogram by [facility] 32w6d, 1822g, 32%, BPP 8/8, MVP 63mm, vtx, posterior placenta, NL umbilical dopplers, uterine art. dopplers abnormal (0.66/0.61)

## 2019-06-17 LAB
APPEARANCE UR: ABNORMAL
BILIRUB UR-MCNC: NEGATIVE — SIGNIFICANT CHANGE UP
COLOR SPEC: YELLOW — SIGNIFICANT CHANGE UP
DIFF PNL FLD: NEGATIVE — SIGNIFICANT CHANGE UP
EPI CELLS # UR: ABNORMAL /HPF
GLUCOSE UR QL: NEGATIVE MG/DL — SIGNIFICANT CHANGE UP
KETONES UR-MCNC: NEGATIVE — SIGNIFICANT CHANGE UP
LEUKOCYTE ESTERASE UR-ACNC: NEGATIVE — SIGNIFICANT CHANGE UP
NITRITE UR-MCNC: NEGATIVE — SIGNIFICANT CHANGE UP
PH UR: 7 — SIGNIFICANT CHANGE UP (ref 5–8)
PROT UR-MCNC: NEGATIVE MG/DL — SIGNIFICANT CHANGE UP
SP GR SPEC: 1.01 — SIGNIFICANT CHANGE UP (ref 1.01–1.03)
UROBILINOGEN FLD QL: 0.2 MG/DL — SIGNIFICANT CHANGE UP (ref 0.2–0.2)

## 2019-06-17 PROCEDURE — 99232 SBSQ HOSP IP/OBS MODERATE 35: CPT | Mod: 25

## 2019-06-17 PROCEDURE — 59025 FETAL NON-STRESS TEST: CPT | Mod: 26

## 2019-06-17 RX ORDER — ENOXAPARIN SODIUM 100 MG/ML
40 INJECTION SUBCUTANEOUS EVERY 24 HOURS
Refills: 0 | Status: DISCONTINUED | OUTPATIENT
Start: 2019-06-17 | End: 2019-06-18

## 2019-06-17 RX ADMIN — Medication 400 MILLIGRAM(S): at 15:27

## 2019-06-17 RX ADMIN — Medication 400 MILLIGRAM(S): at 21:38

## 2019-06-17 RX ADMIN — Medication 400 MILLIGRAM(S): at 06:04

## 2019-06-17 RX ADMIN — Medication 30 MILLIGRAM(S): at 06:04

## 2019-06-17 RX ADMIN — ENOXAPARIN SODIUM 40 MILLIGRAM(S): 100 INJECTION SUBCUTANEOUS at 10:57

## 2019-06-17 NOTE — PROGRESS NOTE ADULT - ASSESSMENT
Ms. Moreno is a 44 y/o  at 33w2d with chronic hypertension vs gestational hypertension.  Ms. Moreno does not meet criteria for preeclampsia as her 24 hour urine protein was < 300 mg/24 hours.   She has no symptoms and her blood results are normal.   She did have an elevated blood pressure when I saw her in February but all of her other blood pressures were normal as far as I can tell.    Procardia XL 30 mg was started two days ago and her blood pressures have been within the normal range or mildly elevated.    Continue to monitor for signs and symptoms of preeclampsia.    She is on Acyclovir for HSV prophylaxis.    NST BID, weekly biophysical profiles.    Marko Meneses MD

## 2019-06-17 NOTE — PROGRESS NOTE ADULT - ASSESSMENT
42 y/o  at 33w2d GA, GBS positive, IVF pregnancy 5 day transfer with donor egg, AMA with elevated risk of trisomy 21 on sequential 1 but low risk NIPS, h/o oral and genital herpes on suppression,, pre-eclampsia without severe features on procardia 30XL, now with mild range BPs,, for inpatient monitoring    # preeclampsia without severe features vs gestational HTN  - s/p transfer to 4D  - BPs q4h  - had isolated severe BPs on  and 6/15. Since patient was started on procardia 30XL all BPs in mild range  - technically met criteria for preeclampsia with severe features (due to severe BPs more than 4 hours apart) but because of the sporadic nature of the severe BPs will not treat as severe features for now.  - Upr/cr 0.4 but 24 hour protein collection negative 210 grams  - for inpatient monitoring due to severe BPs on admission  - preeclamptic labs twice weekly  - if BPs controlled for next few days may consider outpatient monitoring    #AMA  - increased risk of trisomy 21 on sequential I  - low risk NIPS  - patient refused diagnostic testing    # positive serology for HSV1 and HSV2 IgG  - h/o genital and oral herpes  - no prodromic symptoms  - on suppression due to regular contractions    #urinary frequency  - f/u UA, Ucx    # pregnancy  - NST/BID  - will start lovenox for DVT/PE prophylaxis  - EFW monthly  - delivery plan for now 37 weeks  - regular diet  - SCDs while in bed    Will discuss with Dr. Tovar and Dr. Meneses. 44 y/o  at 33w2d GA, GBS positive, IVF pregnancy 5 day transfer with donor egg, AMA with elevated risk of trisomy 21 on sequential 1 but low risk NIPS, h/o oral and genital herpes on suppression, gestational hypertension without severe features vs cHTN, on procardia 30XL, now with mild range BPs,, for inpatient monitoring    # gestational hypertension without severe features vs cHTN  - s/p transfer to 4D  - BPs q4h  - had isolated severe BPs on  and 6/15. Since patient was started on procardia 30XL all BPs in mild range. Technically met criteria for gestational hypertension with severe features (had severe BPs more than 4 hours apart) but because of the sporadic nature of the severe BPs will not treat as severe features for now.  - Upr/cr 0.4 but 24 hour protein collection negative 210 grams.  - preeclamptic labs twice weekly (Tuesday/Friday), next PELs      #AMA  - increased risk of trisomy 21 on sequential I  - low risk NIPS  - patient refused diagnostic testing    # positive serology for HSV1 and HSV2 IgG  - h/o genital and oral herpes  - no prodromic symptoms  - started on suppression this admission    #urinary frequency  - f/u UA, Ucx    # pregnancy  - NST BID  - will start lovenox for DVT/PE prophylaxis  - EFW monthly  - delivery plan for now 37 weeks  - regular diet  - SCDs while in bed    Will discuss with Dr. Tovar and Dr. Meneses.

## 2019-06-17 NOTE — PROGRESS NOTE ADULT - SUBJECTIVE AND OBJECTIVE BOX
[facility] Staff    I saw and evaluated Ms. ABDIRIZAK VILLARREAL  with Dr. Barcenas and I agree with the documentation above.  Ms. ABDIRIZAK VILLARREAL reports positive fetal movement and no vaginal bleeding.  she has no headaches or spots in front of her eyes.    General:  Ms. ABDIRIZAK VILLARREAL is lying in bed.  She appears comfortable.    Vital Signs Last 24 Hrs  T(C): 36.1 (17 Jun 2019 07:25), Max: 36.7 (16 Jun 2019 15:45)  T(F): 96.9 (17 Jun 2019 07:25), Max: 98 (16 Jun 2019 15:45)  HR: 62 (17 Jun 2019 07:25) (52 - 80)  BP: 131/73 (17 Jun 2019 07:25) (130/66 - 152/80)  BP(mean): --  RR: 18 (17 Jun 2019 07:25) (18 - 20)  SpO2: --    HEENT:  Atraumatic.  Extraocular muscles intact.  Dry mucous membranes.  Cardiovascular:  Normal S1 S2.  No murmurs.  Pulmonary:  Clear to auscultation bilaterally.  No wheezing.  Abdomen:  Soft, nontender, nondistended, no rebound, no guarding.  Extremities:  Nontender calves.  Neurology:  Deep tendon reflexes 2+ bilaterally.                          10.8   11.07 )-----------( 171      ( 15 Gelacio 2019 04:30 )             33.3         06-15    139  |  107  |  13  ----------------------------<  82  4.3   |  21  |  0.7      Ca    8.3      15 Gelacio 2019 04:30    TPro  5.1  /  Alb  2.7  /  TBili  0.3  /  DBili  x   /  AST  24  /  ALT  17  /  AlkPhos  81  06-15-19

## 2019-06-17 NOTE — CHART NOTE - NSCHARTNOTEFT_GEN_A_CORE
[facility] PGY3 Note:     Pt seen at bedside for ultrasound:     Cephalic, post placenta, MVP 7.1cm, BPP 8/8     Dr. Kenney and Dr. Meneses to be made aware.

## 2019-06-17 NOTE — CHART NOTE - NSCHARTNOTEFT_GEN_A_CORE
bedside sono: vtx, post placenta, MVP 8.7cm, BPP 8/8    Dr. Butler aware and Dr. Tovar to be made aware.

## 2019-06-17 NOTE — PROGRESS NOTE ADULT - SUBJECTIVE AND OBJECTIVE BOX
MFM procedure note    Non stress test    Date:  [id number]  Start:  11:00 AM  End:  11:37 AM    Baseline: 135  beats per minute  Variability:  minimal to moderate  Accelerations:  10 x 10  Decelerations:  none    Tocometer:  none    Nonreactive non stress test    Check biophysical profile.  Discussed with labor and delivery.    Marko Meneses MD MFM procedure note    Gestational hypertension    Non stress test    Date:  [id number]  Start:  11:00 AM  End:  11:37 AM    Baseline: 135  beats per minute  Variability:  minimal to moderate  Accelerations:  10 x 10  Decelerations:  none    Tocometer:  none    Nonreactive non stress test    Check biophysical profile.  Discussed with labor and delivery.    Marko Meneses MD

## 2019-06-17 NOTE — PROGRESS NOTE ADULT - SUBJECTIVE AND OBJECTIVE BOX
Vital Signs Last 24 Hrs  T(C): 36 (17 Jun 2019 03:11), Max: 37 (16 Jun 2019 07:10)  T(F): 96.8 (17 Jun 2019 03:11), Max: 98.6 (16 Jun 2019 07:10)  HR: 52 (17 Jun 2019 03:11) (31 - 80)  BP: 148/73 (17 Jun 2019 03:11) (117/70 - 152/80)  BP(mean): --  RR: 18 (17 Jun 2019 03:11) (18 - 20)  SpO2: -- M progress note    Patient seen and examined at bedside, resting comfortably. Denies headache, blurry vision, chest pain, SOB, epigastric pain and worsening swelling. Denies contractions, vaginal bleeding and LOF. Feels good fetal movements. Complaining of urinary frequency. Denies dysuria and urgency.    Vital Signs Last 24 Hrs  T(C): 36 (17 Jun 2019 03:11), Max: 37 (16 Jun 2019 07:10)  T(F): 96.8 (17 Jun 2019 03:11), Max: 98.6 (16 Jun 2019 07:10)  HR: 52 (17 Jun 2019 03:11) (31 - 80)  BP: 148/73 (17 Jun 2019 03:11) (117/70 - 152/80)  BP(mean): --  RR: 18 (17 Jun 2019 03:11) (18 - 20)  SpO2: --    GEN: NAD, AAOX3  CVS: RRR, normal S1/S2  lungs; CTAB  abd: soft, gravid, nontender, no palpable ctx, no epigastric/RUQ tenderness  ext: +1 calf edema, no tenderness  neuro: +2 reflexes in b/l UE    6/17 PM NST reactive    6/14 Official Sono: 32w6d, 1822g, 32%, BPP 8/8, MVP 63mm, vtx, posterior placenta, NL umbilical dopplers, uterine art. dopplers abnormal (0.66/0.61)    labs:    6/14 UPr/Cr 0.4, GC/Cl negative, GBS pos    6/15: 11>10.8/33.3<171; 139/4.3/107/21/13/0.7<82; AST/ALT 24/17; uric acid 6.7;     6/14 24hr      meds:    MEDICATIONS  (STANDING):  acyclovir   Oral Tab/Cap 400 milliGRAM(s) Oral three times a day  enoxaparin Injectable 40 milliGRAM(s) SubCutaneous every 24 hours  lactated ringers. 500 milliLiter(s) (1000 mL/Hr) IV Continuous <Continuous>  NIFEdipine XL 30 milliGRAM(s) Oral daily

## 2019-06-18 LAB
ALBUMIN SERPL ELPH-MCNC: 2.9 G/DL — LOW (ref 3.5–5.2)
ALP SERPL-CCNC: 112 U/L — SIGNIFICANT CHANGE UP (ref 30–115)
ALT FLD-CCNC: 12 U/L — SIGNIFICANT CHANGE UP (ref 0–41)
ANION GAP SERPL CALC-SCNC: 12 MMOL/L — SIGNIFICANT CHANGE UP (ref 7–14)
AST SERPL-CCNC: 19 U/L — SIGNIFICANT CHANGE UP (ref 0–41)
BASOPHILS # BLD AUTO: 0.03 K/UL — SIGNIFICANT CHANGE UP (ref 0–0.2)
BASOPHILS NFR BLD AUTO: 0.2 % — SIGNIFICANT CHANGE UP (ref 0–1)
BILIRUB SERPL-MCNC: 0.2 MG/DL — SIGNIFICANT CHANGE UP (ref 0.2–1.2)
BUN SERPL-MCNC: 9 MG/DL — LOW (ref 10–20)
CALCIUM SERPL-MCNC: 9.4 MG/DL — SIGNIFICANT CHANGE UP (ref 8.5–10.1)
CHLORIDE SERPL-SCNC: 108 MMOL/L — SIGNIFICANT CHANGE UP (ref 98–110)
CO2 SERPL-SCNC: 20 MMOL/L — SIGNIFICANT CHANGE UP (ref 17–32)
CREAT SERPL-MCNC: 0.6 MG/DL — LOW (ref 0.7–1.5)
CULTURE RESULTS: SIGNIFICANT CHANGE UP
EOSINOPHIL # BLD AUTO: 0.15 K/UL — SIGNIFICANT CHANGE UP (ref 0–0.7)
EOSINOPHIL NFR BLD AUTO: 1.1 % — SIGNIFICANT CHANGE UP (ref 0–8)
GLUCOSE SERPL-MCNC: 83 MG/DL — SIGNIFICANT CHANGE UP (ref 70–99)
HCT VFR BLD CALC: 36.8 % — LOW (ref 37–47)
HGB BLD-MCNC: 11.8 G/DL — LOW (ref 12–16)
IMM GRANULOCYTES NFR BLD AUTO: 0.7 % — HIGH (ref 0.1–0.3)
LDH SERPL L TO P-CCNC: 244 — HIGH (ref 50–242)
LYMPHOCYTES # BLD AUTO: 1.95 K/UL — SIGNIFICANT CHANGE UP (ref 1.2–3.4)
LYMPHOCYTES # BLD AUTO: 14.6 % — LOW (ref 20.5–51.1)
MCHC RBC-ENTMCNC: 28.9 PG — SIGNIFICANT CHANGE UP (ref 27–31)
MCHC RBC-ENTMCNC: 32.1 G/DL — SIGNIFICANT CHANGE UP (ref 32–37)
MCV RBC AUTO: 90.2 FL — SIGNIFICANT CHANGE UP (ref 81–99)
MONOCYTES # BLD AUTO: 1.17 K/UL — HIGH (ref 0.1–0.6)
MONOCYTES NFR BLD AUTO: 8.7 % — SIGNIFICANT CHANGE UP (ref 1.7–9.3)
NEUTROPHILS # BLD AUTO: 9.99 K/UL — HIGH (ref 1.4–6.5)
NEUTROPHILS NFR BLD AUTO: 74.7 % — SIGNIFICANT CHANGE UP (ref 42.2–75.2)
NRBC # BLD: 0 /100 WBCS — SIGNIFICANT CHANGE UP (ref 0–0)
PLATELET # BLD AUTO: 172 K/UL — SIGNIFICANT CHANGE UP (ref 130–400)
POTASSIUM SERPL-MCNC: 4.3 MMOL/L — SIGNIFICANT CHANGE UP (ref 3.5–5)
POTASSIUM SERPL-SCNC: 4.3 MMOL/L — SIGNIFICANT CHANGE UP (ref 3.5–5)
PROT SERPL-MCNC: 5.8 G/DL — LOW (ref 6–8)
RBC # BLD: 4.08 M/UL — LOW (ref 4.2–5.4)
RBC # FLD: 13.6 % — SIGNIFICANT CHANGE UP (ref 11.5–14.5)
SODIUM SERPL-SCNC: 140 MMOL/L — SIGNIFICANT CHANGE UP (ref 135–146)
SPECIMEN SOURCE: SIGNIFICANT CHANGE UP
URATE SERPL-MCNC: 5.2 MG/DL — SIGNIFICANT CHANGE UP (ref 2.5–7)
WBC # BLD: 13.39 K/UL — HIGH (ref 4.8–10.8)
WBC # FLD AUTO: 13.39 K/UL — HIGH (ref 4.8–10.8)

## 2019-06-18 PROCEDURE — 59025 FETAL NON-STRESS TEST: CPT | Mod: 26

## 2019-06-18 PROCEDURE — 99232 SBSQ HOSP IP/OBS MODERATE 35: CPT | Mod: 25

## 2019-06-18 RX ORDER — SODIUM CHLORIDE 9 MG/ML
1000 INJECTION, SOLUTION INTRAVENOUS
Refills: 0 | Status: DISCONTINUED | OUTPATIENT
Start: 2019-06-18 | End: 2019-06-21

## 2019-06-18 RX ADMIN — Medication 400 MILLIGRAM(S): at 05:50

## 2019-06-18 RX ADMIN — Medication 30 MILLIGRAM(S): at 05:50

## 2019-06-18 RX ADMIN — Medication 12 MILLIGRAM(S): at 12:59

## 2019-06-18 RX ADMIN — Medication 400 MILLIGRAM(S): at 22:29

## 2019-06-18 RX ADMIN — Medication 400 MILLIGRAM(S): at 14:01

## 2019-06-18 RX ADMIN — ENOXAPARIN SODIUM 40 MILLIGRAM(S): 100 INJECTION SUBCUTANEOUS at 10:34

## 2019-06-18 NOTE — CHART NOTE - NSCHARTNOTEFT_GEN_A_CORE
Due to non-reactive NST in both AM and PM (BPP 8/8 in AM/PM), decision made to transfer pt to L&D for prolonged continuous monitoring.    Dr. Butler and Dr. Meneses aware, Dr. Tovar to be made aware.

## 2019-06-18 NOTE — PROGRESS NOTE ADULT - SUBJECTIVE AND OBJECTIVE BOX
[facility] progress note    Patient seen at bedside with Dr. Zafar. Patient had several isolated BPs in the severe range during this hospitalization although she was started on procardia. Now meets criteria for gestational hypertension with severe features vs cHTN with worsening BPs. Recommend  steroids and then induction.     SVE: closed/30/-3, soft, anterior    Plan:  2 doses of betamethasone, first to be given now  IOL to be started tomorrow  @0800  magnesium to be started at time of induction for seizure prophylaxis  BPs q1h  regular diet for now  NST BID    Plan discussed with Dr. Tovar and Dr. Zafar.

## 2019-06-18 NOTE — PROGRESS NOTE ADULT - SUBJECTIVE AND OBJECTIVE BOX
[facility] progress note    Patient seen and examined at bedside. Denies headache, blurry vision, chest pain, shortness of breath, epigastric pain and worsening swelling. Denies contractions, vaginal bleeding and LOF. Feels good fetal movements. Reports urinary frequency has resolved.     Patient was transferred to L&D overnight for continuous monitoring due to nonreactive NST twice during the day yesterday. BPP was 8/8 both times.       PE:  Vital Signs Last 24 Hrs  T(C): 36.6 (2019 23:31), Max: 36.6 (2019 12:06)  T(F): 97.9 (2019 23:31), Max: 97.9 (2019 23:31)  HR: 49 (2019 08:15) (49 - 71)  BP: 149/76 (2019 08:15) (125/73 - 180/83)  BP(mean): --  RR: 18 (2019 05:34) (18 - 18)  SpO2: 100% (2019 00:08) (99% - 100%)    GEN: NAD, AAOX3    GEN: NAD, AAOX3  CVS: RRR, normal S1/S2  lungs; CTAB  abd: soft, gravid, nontender, no palpable ctx, no epigastric/RUQ tenderness  ext: +1 calf edema, no tenderness  neuro: +2 reflexes in b/l UE    EFM: 140/mod/no accels  toco: occasional ctx  SVE; deferred    medS:  MEDICATIONS  (STANDING):  acyclovir   Oral Tab/Cap 400 milliGRAM(s) Oral three times a day  enoxaparin Injectable 40 milliGRAM(s) SubCutaneous every 24 hours  lactated ringers. 1000 milliLiter(s) (125 mL/Hr) IV Continuous <Continuous>  NIFEdipine XL 30 milliGRAM(s) Oral daily    Labs:                       11.8   13.39 )-----------( 172      ( 2019 01:30 )             36.8     06-18    140  |  108  |  9<L>  ----------------------------<  83  4.3   |  20  |  0.6<L>    Ca    9.4      2019 01:30    TPro  5.8<L>  /  Alb  2.9<L>  /  TBili  0.2  /  DBili  x   /  AST  19  /  ALT  12  /  AlkPhos  112  06-18    Urinalysis Basic - ( 2019 07:07 )    Color: Yellow / Appearance: Cloudy / S.015 / pH: x  Gluc: x / Ketone: Negative  / Bili: Negative / Urobili: 0.2 mg/dL   Blood: x / Protein: Negative mg/dL / Nitrite: Negative   Leuk Esterase: Negative / RBC: x / WBC x   Sq Epi: x / Non Sq Epi: Occasional /HPF / Bacteria: x     24hr       Ucx pending [facility] progress note    Patient seen and examined at bedside. Denies headache, blurry vision, chest pain, shortness of breath, epigastric pain and worsening swelling. Denies contractions, vaginal bleeding and LOF. Feels good fetal movements. Reports urinary frequency has resolved.     Patient was transferred to L&D overnight for continuous monitoring due to nonreactive NST twice during the day yesterday. BPP was 8/8 both times. Upon arrival to L&D had severe range /83 --> repeat 157/74. This morning @0800 again severe /82 -> repeat 149/76.       PE:  Vital Signs Last 24 Hrs  T(C): 36.6 (2019 23:31), Max: 36.6 (2019 12:06)  T(F): 97.9 (2019 23:31), Max: 97.9 (2019 23:31)  HR: 49 (2019 08:15) (49 - 71)  BP: 149/76 (2019 08:15) (125/73 - 180/83)  BP(mean): --  RR: 18 (2019 05:34) (18 - 18)  SpO2: 100% (2019 00:08) (99% - 100%)    GEN: NAD, AAOX3    GEN: NAD, AAOX3  CVS: RRR, normal S1/S2  lungs; CTAB  abd: soft, gravid, nontender, no palpable ctx, no epigastric/RUQ tenderness  ext: +1 calf edema, no tenderness  neuro: +2 reflexes in b/l UE    EFM: 140/mod/no accels  toco: occasional ctx  SVE; deferred    medS:  MEDICATIONS  (STANDING):  acyclovir   Oral Tab/Cap 400 milliGRAM(s) Oral three times a day  enoxaparin Injectable 40 milliGRAM(s) SubCutaneous every 24 hours  lactated ringers. 1000 milliLiter(s) (125 mL/Hr) IV Continuous <Continuous>  NIFEdipine XL 30 milliGRAM(s) Oral daily    Labs:                       11.8   13.39 )-----------( 172      ( 2019 01:30 )             36.8     06-18    140  |  108  |  9<L>  ----------------------------<  83  4.3   |  20  |  0.6<L>    Ca    9.4      2019 01:30    TPro  5.8<L>  /  Alb  2.9<L>  /  TBili  0.2  /  DBili  x   /  AST  19  /  ALT  12  /  AlkPhos  112  06-18    Urinalysis Basic - ( 2019 07:07 )    Color: Yellow / Appearance: Cloudy / S.015 / pH: x  Gluc: x / Ketone: Negative  / Bili: Negative / Urobili: 0.2 mg/dL   Blood: x / Protein: Negative mg/dL / Nitrite: Negative   Leuk Esterase: Negative / RBC: x / WBC x   Sq Epi: x / Non Sq Epi: Occasional /HPF / Bacteria: x     24hr       Ucx pending [facility] progress note    Patient seen and examined at bedside. Denies headache, blurry vision, chest pain, shortness of breath, epigastric pain and worsening swelling. Denies contractions, vaginal bleeding and LOF. Feels good fetal movements. Reports urinary frequency has resolved.     Patient was transferred to L&D overnight for continuous monitoring due to nonreactive NST twice during the day yesterday. BPP was 8/8 both times. Upon arrival to L&D had a severe BP but repeat w       PE:  Vital Signs Last 24 Hrs  T(C): 36.6 (2019 23:31), Max: 36.6 (2019 12:06)  T(F): 97.9 (2019 23:31), Max: 97.9 (2019 23:31)  HR: 49 (2019 08:15) (49 - 71)  BP: 149/76 (2019 08:15) (125/73 - 180/83)  BP(mean): --  RR: 18 (2019 05:34) (18 - 18)  SpO2: 100% (2019 00:08) (99% - 100%)    GEN: NAD, AAOX3    GEN: NAD, AAOX3  CVS: RRR, normal S1/S2  lungs; CTAB  abd: soft, gravid, nontender, no palpable ctx, no epigastric/RUQ tenderness  ext: +1 calf edema, no tenderness  neuro: +2 reflexes in b/l UE    EFM: 140/mod/no accels  toco: occasional ctx  SVE; deferred    medS:  MEDICATIONS  (STANDING):  acyclovir   Oral Tab/Cap 400 milliGRAM(s) Oral three times a day  enoxaparin Injectable 40 milliGRAM(s) SubCutaneous every 24 hours  lactated ringers. 1000 milliLiter(s) (125 mL/Hr) IV Continuous <Continuous>  NIFEdipine XL 30 milliGRAM(s) Oral daily    Labs:                       11.8   13.39 )-----------( 172      ( 2019 01:30 )             36.8     06-18    140  |  108  |  9<L>  ----------------------------<  83  4.3   |  20  |  0.6<L>    Ca    9.4      2019 01:30    TPro  5.8<L>  /  Alb  2.9<L>  /  TBili  0.2  /  DBili  x   /  AST  19  /  ALT  12  /  AlkPhos  112  06-18    Urinalysis Basic - ( 2019 07:07 )    Color: Yellow / Appearance: Cloudy / S.015 / pH: x  Gluc: x / Ketone: Negative  / Bili: Negative / Urobili: 0.2 mg/dL   Blood: x / Protein: Negative mg/dL / Nitrite: Negative   Leuk Esterase: Negative / RBC: x / WBC x   Sq Epi: x / Non Sq Epi: Occasional /HPF / Bacteria: x     24hr       Ucx pending

## 2019-06-18 NOTE — PROGRESS NOTE ADULT - ASSESSMENT
42 y/o  at 33w2d GA, GBS positive, IVF pregnancy 5 day transfer with donor egg, AMA with elevated risk of trisomy 21 on sequential 1 but low risk NIPS, h/o oral and genital herpes on suppression, gestational hypertension without severe features vs cHTN, on procardia 30XL, now with mild range BPs,, for inpatient monitoring    # gestational hypertension without severe features vs cHTN  - s/p transfer to 4D  - BPs q4h  - had isolated severe BPs on  and 6/15. Since patient was started on procardia 30XL all BPs in mild range. Technically met criteria for gestational hypertension with severe features (had severe BPs more than 4 hours apart) but because of the sporadic nature of the severe BPs will not treat as severe features for now.  - Upr/cr 0.4 but 24 hour protein collection negative 210 grams.  - preeclamptic labs twice weekly (Tuesday/Friday), next PELs      #AMA  - increased risk of trisomy 21 on sequential I  - low risk NIPS  - patient refused diagnostic testing    # positive serology for HSV1 and HSV2 IgG  - h/o genital and oral herpes  - no prodromic symptoms  - started on suppression this admission    #urinary frequency  - f/u UA, Ucx    # pregnancy  - NST BID  - will start lovenox for DVT/PE prophylaxis  - EFW monthly  - delivery plan for now 37 weeks  - regular diet  - SCDs while in bed    Will discuss with Dr. Tovar and Dr. Meneses. 42 y/o  at 33w3d GA, GBS positive, IVF pregnancy 5 day transfer with donor egg, AMA with elevated risk of trisomy 21 on sequential 1 but low risk NIPS, h/o oral and genital herpes on suppression, cHTN vs severe gestational hypertension, on procardia 30XL, with isolated severe BPs, BPs mostly in mild range but increasing, for inpatient monitoring    # cHTN with worsening BPs vs severe gHTN  - on procardia 30XL  - BPs q4h  - had more severely elevated BPs, isolated.   - Upr/cr 0.4 but 24 hour protein collection negative 210 grams.  - PELs wnl  - consider increasing procardia to 60XL  - consider earlier delivery at 34 weeks  - daily weights    #AMA  - increased risk of trisomy 21 on sequential I  - low risk NIPS  - patient refused diagnostic testing    # positive serology for HSV1 and HSV2 IgG  - h/o genital and oral herpes  - no prodromic symptoms  - started on suppression this admission    #urinary frequency  - UA negative, Ucx pending  - symptoms resolved    # nonreactive tracing  - continuous EFM and toco for now  - BPP  yesterday twice    # pregnancy  - lovenox for DVT/PE prophylaxis  - EFW monthly  - BPPs weekly  - PELs twice weekly  - need to discuss delivery plan  - regular diet    Will discuss with Dr. Tovar and Dr. Zafar. 42 y/o  at 33w2d GA, GBS positive, IVF pregnancy 5 day transfer with donor egg, AMA with elevated risk of trisomy 21 on sequential 1 but low risk NIPS, h/o oral and genital herpes on suppression, with isolated severe BPs , on procardia 30XL, now with mild range BPs,, for inpatient monitoring    # gestational hypertension without severe features vs cHTN  - s/p transfer to 4D  - BPs q4h  - had isolated severe BPs on  and 6/15. Since patient was started on procardia 30XL all BPs in mild range. Technically met criteria for gestational hypertension with severe features (had severe BPs more than 4 hours apart) but because of the sporadic nature of the severe BPs will not treat as severe features for now.  - Upr/cr 0.4 but 24 hour protein collection negative 210 grams.  - preeclamptic labs twice weekly (Tuesday/Friday), next PELs      #AMA  - increased risk of trisomy 21 on sequential I  - low risk NIPS  - patient refused diagnostic testing    # positive serology for HSV1 and HSV2 IgG  - h/o genital and oral herpes  - no prodromic symptoms  - started on suppression this admission    #urinary frequency  - f/u UA, Ucx    # pregnancy  - NST BID  - will start lovenox for DVT/PE prophylaxis  - EFW monthly  - delivery plan for now 37 weeks  - regular diet  - SCDs while in bed    Will discuss with Dr. Tovar and Dr. Meneses. 44 y/o  at 33w3d GA, GBS positive, IVF pregnancy 5 day transfer with donor egg, AMA with elevated risk of trisomy 21 on sequential 1 but low risk NIPS, h/o oral and genital herpes on suppression, with isolated severe BPs , on procardia 30XL, severe gHTN vs cHTN with worsening BPs, now with mild range BPs, s/p transfer to L&D for continuous EFM and BP monitoring.     # severe gestational hypertension vs chronic hypertension with worsening BPs  - s/p transfer to L&D for continuous monitoring  - BPs q4h  - Patient had 2 more severe BPs since transfer to L&D. Now meets criteria for severe gHTN vs cHTN with worsening BPs  - Upr/cr 0.4 but 24 hour protein collection negative 210 grams.  - preeclamptic labs twice weekly (Tuesday/Friday), next PELs    - will discuss plan for IOL with MFM, possibly 34 weeks    # nonreactive tracing  - BPP  twice yesterday  - continuous monitoring for now    #AMA  - increased risk of trisomy 21 on sequential I  - low risk NIPS  - patient refused diagnostic testing    # positive serology for HSV1 and HSV2 IgG  - h/o genital and oral herpes  - no prodromic symptoms  - started on suppression this admission    #urinary frequency  - UA negative, f/u Ucx  - symptoms resolved    # pregnancy  - continuous EFM and toco  - lovenox for DVT/PE prophylaxis  - EFW monthly, BPP weekly  - discuss delivery plan with MFM  - regular diet    Will discuss with Dr. Tovar and Dr. Zafar.

## 2019-06-19 LAB
AMPHET UR-MCNC: NEGATIVE — SIGNIFICANT CHANGE UP
BARBITURATES UR SCN-MCNC: NEGATIVE — SIGNIFICANT CHANGE UP
BENZODIAZ UR-MCNC: NEGATIVE — SIGNIFICANT CHANGE UP
BLD GP AB SCN SERPL QL: SIGNIFICANT CHANGE UP
BUPRENORPHINE SCREEN, URINE RESULT: NEGATIVE — SIGNIFICANT CHANGE UP
COCAINE METAB.OTHER UR-MCNC: NEGATIVE — SIGNIFICANT CHANGE UP
HIV 1 & 2 AB SERPL IA.RAPID: SIGNIFICANT CHANGE UP
L&D DRUG SCREEN, URINE: SIGNIFICANT CHANGE UP
METHADONE UR-MCNC: NEGATIVE — SIGNIFICANT CHANGE UP
OPIATES UR-MCNC: NEGATIVE — SIGNIFICANT CHANGE UP
OXYCODONE UR-MCNC: NEGATIVE — SIGNIFICANT CHANGE UP
PCP UR-MCNC: NEGATIVE — SIGNIFICANT CHANGE UP
PRENATAL SYPHILIS TEST: SIGNIFICANT CHANGE UP
PROPOXYPHENE QUALITATIVE URINE RESULT: NEGATIVE — SIGNIFICANT CHANGE UP

## 2019-06-19 PROCEDURE — 59025 FETAL NON-STRESS TEST: CPT | Mod: 26

## 2019-06-19 PROCEDURE — 99232 SBSQ HOSP IP/OBS MODERATE 35: CPT | Mod: 25

## 2019-06-19 RX ORDER — AMPICILLIN TRIHYDRATE 250 MG
2 CAPSULE ORAL ONCE
Refills: 0 | Status: DISCONTINUED | OUTPATIENT
Start: 2019-06-19 | End: 2019-06-19

## 2019-06-19 RX ORDER — ACETAMINOPHEN 500 MG
650 TABLET ORAL ONCE
Refills: 0 | Status: COMPLETED | OUTPATIENT
Start: 2019-06-19 | End: 2019-06-19

## 2019-06-19 RX ORDER — AMPICILLIN TRIHYDRATE 250 MG
CAPSULE ORAL
Refills: 0 | Status: DISCONTINUED | OUTPATIENT
Start: 2019-06-19 | End: 2019-06-19

## 2019-06-19 RX ORDER — AMPICILLIN TRIHYDRATE 250 MG
2 CAPSULE ORAL ONCE
Refills: 0 | Status: COMPLETED | OUTPATIENT
Start: 2019-06-19 | End: 2019-06-19

## 2019-06-19 RX ORDER — AMPICILLIN TRIHYDRATE 250 MG
1 CAPSULE ORAL EVERY 4 HOURS
Refills: 0 | Status: DISCONTINUED | OUTPATIENT
Start: 2019-06-19 | End: 2019-06-20

## 2019-06-19 RX ORDER — DINOPROSTONE 10 MG/241MG
10 INSERT VAGINAL ONCE
Refills: 0 | Status: COMPLETED | OUTPATIENT
Start: 2019-06-19 | End: 2019-06-19

## 2019-06-19 RX ADMIN — Medication 216 GRAM(S): at 21:25

## 2019-06-19 RX ADMIN — Medication 400 MILLIGRAM(S): at 22:29

## 2019-06-19 RX ADMIN — Medication 400 MILLIGRAM(S): at 13:29

## 2019-06-19 RX ADMIN — Medication 30 MILLIGRAM(S): at 06:36

## 2019-06-19 RX ADMIN — Medication 650 MILLIGRAM(S): at 06:40

## 2019-06-19 RX ADMIN — Medication 12 MILLIGRAM(S): at 12:58

## 2019-06-19 RX ADMIN — DINOPROSTONE 10 MILLIGRAM(S): 10 INSERT VAGINAL at 21:17

## 2019-06-19 RX ADMIN — Medication 400 MILLIGRAM(S): at 06:36

## 2019-06-19 NOTE — PROGRESS NOTE ADULT - SUBJECTIVE AND OBJECTIVE BOX
[facility] progress note    Patient seen and examined at bedside, resting comfortably. Reports mild headache from difficulty sleeping. Denies blurry vision, chest pain, SOB, epigastric pain and leg swelling. Denies contractions, vaginal bleeding and LOF. Feels good fetal movements.     PE:  Vital Signs Last 24 Hrs  T(C): 36.8 (19 Jun 2019 05:27), Max: 36.9 (19 Jun 2019 02:00)  T(F): 98.24 (19 Jun 2019 05:27), Max: 98.42 (19 Jun 2019 02:00)  HR: 75 (19 Jun 2019 05:42) (49 - 80)  BP: 110/66 (19 Jun 2019 05:42) (110/66 - 162/82)  BP(mean): --  RR: --  SpO2: --    GEN: NAD, AAOX3  CVS: RRR, normal S1/S2  lungs; CTAB  abd: soft, gravid, nontender, no palpable ctx, no epigastric/RUQ tenderness  ext: +1 calf edema, no tenderness, SCDs in place  neuro: +2 reflexes in b/l UE    EFM: 120/mod/accels+  toco: q4 min.  SVE: deferred, last exam 6/18 @1200 was 0/30/-3, cephalic, intact    6/17 AM bedside sonogram: cephalic, post placenta, MVP 7.1cm, BPP 8/8  PM Bedside sonogram: vtx, post placenta, MVP 8.7cm, BPP 8/8    meds:  MEDICATIONS  (STANDING):  acyclovir   Oral Tab/Cap 400 milliGRAM(s) Oral three times a day  betamethasone Injectable 12 milliGRAM(s) IntraMuscular every 24 hours  lactated ringers. 1000 milliLiter(s) (125 mL/Hr) IV Continuous <Continuous>  NIFEdipine XL 30 milliGRAM(s) Oral daily    labs:                        11.8   13.39 )-----------( 172      ( 18 Jun 2019 01:30 )             36.8     06-18    140  |  108  |  9<L>  ----------------------------<  83  4.3   |  20  |  0.6<L>    Ca    9.4      18 Jun 2019 01:30    TPro  5.8<L>  /  Alb  2.9<L>  /  TBili  0.2  /  DBili  x   /  AST  19  /  ALT  12  /  AlkPhos  112  06-18 6/14: 24hr      6/17: UA neg; UCx neg   GBS positive

## 2019-06-19 NOTE — CONSULT NOTE PEDS - SUBJECTIVE AND OBJECTIVE BOX
Asked by OB to consult on the 43 year old  mother who presented to labor room at 32 6/7 weeks gestation of an IVF (donor egg) pregnancy with hypertension.  Presently mother is in high risk area for monitoring and management of gestational hypertension. Mother is 33 4/7 weeks gestation today.  Mother received Betamethasone x 2 doses. Mother is A(+)/RPR(-)/HIV(-)/Hepatitis B(-)/Chlamydia and GC(-)/HSV I and II IgG(+), on acyclovir for suppression,  and GBS(+).    Spoke to mother bedside, informed her of the possible complications this  female might have when born. Mother is being induced for gestational hypertension.  Spoke to her about RDS, possible infection, nutrition, IVH, jaundice among other topics typical of prematurity. I expressed to her, that her baby might just have minimal respiratory distress,  She is 33(+) weeks and received steroids, helping in lung maturation. She seemed to understand and asked appropriate questions, her previous pregnancy was 24 years ago and term.

## 2019-06-19 NOTE — PROGRESS NOTE ADULT - ASSESSMENT
44 y/o  at 33w4d GA, GBS positive, IVF regnancy, AMA, severe gestational hypertension vs cHTN with worsening BPs, on procardia 30XL, 2 isolated severe BPs yesterday but now BPs in mild range, s/p celestone x1, for inpatient monitoring.    # severe gHTN vs cHTN with worsening BPs  - transferred to L&D for continuous EFM and BP monitoring  - BPs qhour  - Had yesterday 2 isolated severe BPs but now BPs improved in mild range  - 24 hour protein collection negative 210 grams  - preeclamptic labs twice weekly (Tuesday/Friday), next PELs    - initial plan was for induction this morning. However, due to improved BPs and reactive tracing, induction could possibly be delayed to 34 weeks to decrease risks of prematurity  - s/p celestone x 1 yesterday, will receive second dose today    #AMA  - increased risk of trisomy 21 on sequential I  - low risk NIPS  - patient refused diagnostic testing    # positive serology for HSV1 and HSV2 IgG  - h/o genital and oral herpes  - no prodromic symptoms  - started on suppression this admission    #urinary frequency  - UA and Ucx negative  - symptoms resolved    # r/o  labor  - currently cely q4-5 minutes  - not felt by patient and ctx nonpalpable  - IV fluids started  - SVE yesterday /-3  - if continues cely might repeat vaginal exam    # pregnancy  - NST BID  - lovenox d/jayro for possible induction, now on SCDs. Will restart lovenox if induction plan changes after discussion with MFM.  - BPPs weekly, EFW monthly  - delivery plan to be discussed today with MFM, possible delay for 34 weeks  - regular diet  - needs magnesium for seizure prophylaxis and ampicillin for GBS prophylaxis with IOL  - admission labs ordered for this morning    Will discuss with Dr. Tovar and Dr. Meneses. 42 y/o  at 33w4d GA, GBS positive, IVF regnancy, AMA, severe gestational hypertension vs cHTN with worsening BPs, on procardia 30XL, 2 isolated severe BPs yesterday but now BPs in mild range, s/p celestone x1, for inpatient monitoring.    # severe gHTN vs cHTN with worsening BPs  - transferred to L&D for continuous EFM and BP monitoring  - BPs qhour  - Had yesterday 2 isolated severe BPs but now BPs improved in mild range  - 24 hour protein collection negative 210 grams  - preeclamptic labs twice weekly (Tuesday/Friday), next PELs    - yesterday plan made to induce today.  - s/p celestone x 1 yesterday, will receive second dose today    #AMA  - increased risk of trisomy 21 on sequential I  - low risk NIPS  - patient refused diagnostic testing    # positive serology for HSV1 and HSV2 IgG  - h/o genital and oral herpes  - no prodromic symptoms  - started on suppression this admission    #urinary frequency  - UA and Ucx negative  - symptoms resolved    # r/o  labor  - currently cely q4-5 minutes  - not felt by patient and ctx nonpalpable  - IV fluids started  - SVE yesterday /-3  - if continues cely might repeat vaginal exam    # pregnancy  - lovenox d/jayro for induction, now on SCDs.   - BPPs weekly, EFW monthly  - needs magnesium for seizure prophylaxis and ampicillin for GBS prophylaxis with active labor    Will discuss with Dr. Tovar and Dr. Meneses.

## 2019-06-19 NOTE — PROGRESS NOTE ADULT - ATTENDING COMMENTS
Patient was seen and examined with the resident. The risks and benefits of continued observation versus delivery were discussed. She had the opportunity to ask questions and they were answered.
[facility] Staff    I saw and evaluated Ms. ABDIRIZAK MORENO  with Dr. Barcenas and I agree with the documentation above.  Ms. ABDIRIZAK MORENO reports positive fetal movement and no vaginal bleeding.  She feels good.    General:  Ms. ABDIRIZAK MORENO is lying in bed.  She appears comfortable.    Vital Signs Last 24 Hrs  T(C): 36.4 (2019 08:00), Max: 36.9 (2019 02:00)  T(F): 97.52 (2019 08:00), Max: 98.42 (2019 02:00)  HR: 80 (2019 09:42) (52 - 80)  BP: 137/69 (2019 09:42) (110/66 - 151/75)  BP(mean): --  RR: --  SpO2: --    Cardiovascular:  Normal S1 S2.  No murmurs.  Pulmonary:  Clear to auscultation bilaterally.  No wheezing.  Abdomen:  Soft, nontender, nondistended, no rebound, no guarding.  Extremities:  Nontender calves.  Neurology:  Deep tendon reflexes 2+ bilaterally.                          11.8   13.39 )-----------( 172      ( 2019 01:30 )             36.8         06-18    140  |  108  |  9   ----------------------------<  83  4.3   |  20  |  0.6      Ca    9.4      2019 01:30      TPro  5.8  /  Alb  2.9  /  TBili  0.2  /  DBili  x   /  AST  19  /  ALT  12  /  AlkPhos  112  19    Group B Strep Detection by PCR (19 @ 05:40)    Source Group B Strep: Vag/Rectal    Group B Beta Strep DNA (PCR): Detected      Ms. Moreno is a 42 y/o  at 33w4d GA admitted with elevated blood pressures, most likely has gestational hypertension with severe features. Yesterday the plan was made for induction of labor today.  Given that her blood pressures have been normal to mildly elevated over the past 24 hours I would not start induction of labor until a second dose of  steroids has been given and a few hours for it to take effect.   Ampicillin should start at the time of induction and Magnesium sulfate for seizure prophylaxis should start in active labor.  On acyclovir for herpes suppression.    Consider NICU consult.    Marko Meneses MD

## 2019-06-19 NOTE — PROGRESS NOTE ADULT - SUBJECTIVE AND OBJECTIVE BOX
PGY3 progress note    Patient with gestational hypertension with severe features, s/p 2 doses of celestone. Plan for induction tonight.     EFM: 125/mod/accels+  toco: no ctx  Bedside sonogram: vertex    Plan:  Will move to labor room once one becomes available  Will recheck and if cervix still unfavorable will insert cervidil  ampicillin to be started with start of induction  Mag for seizure prophylaxis to be started with active labor and until 24 hours postpartum    Dr. Carlin and Dr. Tovar aware.

## 2019-06-19 NOTE — PROGRESS NOTE ADULT - SUBJECTIVE AND OBJECTIVE BOX
MFM procedure note    Gestational hypertension with severe features    Non stress test    Date:  6/19/2019  Start:  9:03 AM  End:  10:03 AM    Baseline:   130 beats per minute  Variability:  moderate  Accelerations:  present  Decelerations:  absent    Tocometer:  absent    Reassuring non stress test    Marko Meneses MD

## 2019-06-20 ENCOUNTER — RESULT REVIEW (OUTPATIENT)
Age: 44
End: 2019-06-20

## 2019-06-20 LAB
MEV IGG SER-ACNC: 21 AU/ML — SIGNIFICANT CHANGE UP
MEV IGG+IGM SER-IMP: NEGATIVE — SIGNIFICANT CHANGE UP

## 2019-06-20 PROCEDURE — 88307 TISSUE EXAM BY PATHOLOGIST: CPT | Mod: 26

## 2019-06-20 RX ORDER — SODIUM CHLORIDE 9 MG/ML
1000 INJECTION, SOLUTION INTRAVENOUS
Refills: 0 | Status: DISCONTINUED | OUTPATIENT
Start: 2019-06-20 | End: 2019-06-21

## 2019-06-20 RX ORDER — DIPHENHYDRAMINE HCL 50 MG
25 CAPSULE ORAL EVERY 6 HOURS
Refills: 0 | Status: DISCONTINUED | OUTPATIENT
Start: 2019-06-20 | End: 2019-06-25

## 2019-06-20 RX ORDER — ACETAMINOPHEN 500 MG
975 TABLET ORAL
Refills: 0 | Status: DISCONTINUED | OUTPATIENT
Start: 2019-06-21 | End: 2019-06-25

## 2019-06-20 RX ORDER — OXYTOCIN 10 UNIT/ML
41.67 VIAL (ML) INJECTION
Qty: 20 | Refills: 0 | Status: DISCONTINUED | OUTPATIENT
Start: 2019-06-20 | End: 2019-06-25

## 2019-06-20 RX ORDER — CEFAZOLIN SODIUM 1 G
2000 VIAL (EA) INJECTION ONCE
Refills: 0 | Status: COMPLETED | OUTPATIENT
Start: 2019-06-20 | End: 2019-06-20

## 2019-06-20 RX ORDER — MAGNESIUM HYDROXIDE 400 MG/1
30 TABLET, CHEWABLE ORAL
Refills: 0 | Status: DISCONTINUED | OUTPATIENT
Start: 2019-06-20 | End: 2019-06-25

## 2019-06-20 RX ORDER — OXYTOCIN 10 UNIT/ML
1 VIAL (ML) INJECTION
Qty: 30 | Refills: 0 | Status: DISCONTINUED | OUTPATIENT
Start: 2019-06-20 | End: 2019-06-25

## 2019-06-20 RX ORDER — GLYCERIN ADULT
1 SUPPOSITORY, RECTAL RECTAL AT BEDTIME
Refills: 0 | Status: DISCONTINUED | OUTPATIENT
Start: 2019-06-20 | End: 2019-06-25

## 2019-06-20 RX ORDER — NALOXONE HYDROCHLORIDE 4 MG/.1ML
0.1 SPRAY NASAL
Refills: 0 | Status: DISCONTINUED | OUTPATIENT
Start: 2019-06-20 | End: 2019-06-25

## 2019-06-20 RX ORDER — KETOROLAC TROMETHAMINE 30 MG/ML
30 SYRINGE (ML) INJECTION EVERY 6 HOURS
Refills: 0 | Status: DISCONTINUED | OUTPATIENT
Start: 2019-06-21 | End: 2019-06-21

## 2019-06-20 RX ORDER — MAGNESIUM SULFATE 500 MG/ML
4 VIAL (ML) INJECTION ONCE
Refills: 0 | Status: COMPLETED | OUTPATIENT
Start: 2019-06-20 | End: 2019-06-20

## 2019-06-20 RX ORDER — ONDANSETRON 8 MG/1
4 TABLET, FILM COATED ORAL EVERY 6 HOURS
Refills: 0 | Status: DISCONTINUED | OUTPATIENT
Start: 2019-06-20 | End: 2019-06-25

## 2019-06-20 RX ORDER — OXYCODONE HYDROCHLORIDE 5 MG/1
5 TABLET ORAL ONCE
Refills: 0 | Status: DISCONTINUED | OUTPATIENT
Start: 2019-06-20 | End: 2019-06-25

## 2019-06-20 RX ORDER — ENOXAPARIN SODIUM 100 MG/ML
40 INJECTION SUBCUTANEOUS DAILY
Refills: 0 | Status: DISCONTINUED | OUTPATIENT
Start: 2019-06-21 | End: 2019-06-25

## 2019-06-20 RX ORDER — MAGNESIUM SULFATE 500 MG/ML
2 VIAL (ML) INJECTION
Qty: 40 | Refills: 0 | Status: DISCONTINUED | OUTPATIENT
Start: 2019-06-20 | End: 2019-06-22

## 2019-06-20 RX ORDER — IBUPROFEN 200 MG
600 TABLET ORAL EVERY 6 HOURS
Refills: 0 | Status: COMPLETED | OUTPATIENT
Start: 2019-06-20 | End: 2020-05-18

## 2019-06-20 RX ORDER — DEXAMETHASONE 0.5 MG/5ML
4 ELIXIR ORAL EVERY 6 HOURS
Refills: 0 | Status: DISCONTINUED | OUTPATIENT
Start: 2019-06-20 | End: 2019-06-25

## 2019-06-20 RX ORDER — OXYCODONE HYDROCHLORIDE 5 MG/1
5 TABLET ORAL
Refills: 0 | Status: DISCONTINUED | OUTPATIENT
Start: 2019-06-20 | End: 2019-06-25

## 2019-06-20 RX ORDER — DOCUSATE SODIUM 100 MG
100 CAPSULE ORAL
Refills: 0 | Status: DISCONTINUED | OUTPATIENT
Start: 2019-06-20 | End: 2019-06-25

## 2019-06-20 RX ORDER — LANOLIN
1 OINTMENT (GRAM) TOPICAL EVERY 6 HOURS
Refills: 0 | Status: DISCONTINUED | OUTPATIENT
Start: 2019-06-20 | End: 2019-06-25

## 2019-06-20 RX ORDER — SIMETHICONE 80 MG/1
80 TABLET, CHEWABLE ORAL THREE TIMES A DAY
Refills: 0 | Status: DISCONTINUED | OUTPATIENT
Start: 2019-06-20 | End: 2019-06-25

## 2019-06-20 RX ADMIN — Medication 100 GRAM(S): at 22:54

## 2019-06-20 RX ADMIN — Medication 108 GRAM(S): at 09:29

## 2019-06-20 RX ADMIN — Medication 400 MILLIGRAM(S): at 06:15

## 2019-06-20 RX ADMIN — Medication 108 GRAM(S): at 01:25

## 2019-06-20 RX ADMIN — Medication 30 MILLIGRAM(S): at 06:15

## 2019-06-20 RX ADMIN — Medication 108 GRAM(S): at 13:28

## 2019-06-20 RX ADMIN — Medication 30 MILLIGRAM(S): at 22:38

## 2019-06-20 RX ADMIN — Medication 108 GRAM(S): at 05:25

## 2019-06-20 RX ADMIN — Medication 400 MILLIGRAM(S): at 14:09

## 2019-06-20 RX ADMIN — Medication 100 MILLIGRAM(S): at 19:33

## 2019-06-20 RX ADMIN — Medication 975 MILLIGRAM(S): at 23:55

## 2019-06-20 RX ADMIN — Medication 30 MILLIGRAM(S): at 21:00

## 2019-06-20 NOTE — PROGRESS NOTE ADULT - SUBJECTIVE AND OBJECTIVE BOX
Pt seen at bedside for eval for decreased variability, feels light contractions, and still feels fetal movement.     EFM: 125, min malcolm, no accels  [place]: q4-5 mins  SVE: deferred, last 1.5/60/-2 @1415    Will decrease pitocin in half from 16mu/min, to 8mu/min. Dr. Tovar at bedside, discussed the need for delivery given diagnosis of preeclampsia with severe features and the unfavorable prognosis for vaginal delivery given the poor tolerability of FH to pitocin and the lack of progression of cervical exam. Pt informed of the possible need for  section, if unable to increase pitocin to induce labor. Pt understood, all questions answered.    Dr. Tovar at bedside

## 2019-06-20 NOTE — PRE-ANESTHESIA EVALUATION ADULT - NSANTHOSAYNRD_GEN_A_CORE
No. NUZHAT screening performed.  STOP BANG Legend: 0-2 = LOW Risk; 3-4 = INTERMEDIATE Risk; 5-8 = HIGH Risk

## 2019-06-20 NOTE — BRIEF OPERATIVE NOTE - NSICDXBRIEFPOSTOP_GEN_ALL_CORE_FT
POST-OP DIAGNOSIS:  Gestational hypertension 20-Jun-2019 21:50:40  Shannon Colmenares  Fetal heart rate non-reassuring affecting management of mother 20-Jun-2019 21:50:14 Delivered Shannon Colmenares

## 2019-06-20 NOTE — PROGRESS NOTE ADULT - SUBJECTIVE AND OBJECTIVE BOX
PGY3 Note    Pt seen and evaluated at bedside for labor progression. Re-examined by PMD, remains 1.5/60/2, unchanged from this morning despite induction on pitocin for 12 hours. FHR minimal variability with subtle late decels. PMD discussed ooptions at this time including continued IOL, expectant mgt vs  delivery.  R/B/A and indications discussed by Dr. Tovar at bedside, pt voiced understanding, agreed, and chose to proceed.     -NPO  -Prep/salas  -Anesthesia/peds aware  -On call to OR.     Dr. Kenney and Dr. Tovar at bedside.

## 2019-06-20 NOTE — PROGRESS NOTE ADULT - SUBJECTIVE AND OBJECTIVE BOX
PGY4 Note    Patient seen at bedside for evaluation of labor progression, doing well, no complaints.     T(F): 98.24 ( @ 14:31), Max: 98.78 ( @ 06:55)  HR: 57 ( @ 15:25)  BP: 147/84 ( @ 15:25) (123/66 - 172/79)  RR: 18 ( @ 06:55)  EFM: 130, mod malcolm, +accel  TOCO: q2-3 mins  SVE: 1.5/50/-3    Medications:  acetaminophen   Tablet ..: 650 ( @ 02:06)  acetaminophen   Tablet ..: 650 ( @ 11:40)  acetaminophen   Tablet ..: 650 ( @ 06:40)  acyclovir   Oral Tab/Cap: 400 ( @ 14:09),  400 ( @ 06:15),  400 ( @ 22:29),  400 ( 13:29),  400 ( @ 06:36),  400 ( @ 22:29),  400 ( @ 14:01),  400 ( @ 05:50),  400 ( @ 21:38),  400 ( @ 15:27),  400 ( @ 06:04),  400 ( @ 22:08),  400 ( @ 14:19),  400 ( @ 06:14),  400 (06-15 @ 21:51),  400 (06-15 @ 14:04),  400 (06-15 @ 06:11),  400 ( @ 21:45),  400 ( @ 14:50)  ampicillin  IVPB: 216 ( 21:25)  ampicillin  IVPB: 108 ( @ 13:28),  108 ( @ 09:29),  108 ( 05:25),  108 ( 01:25)  betamethasone Injectable: 12 ( @ 12:58),  12 ( @ 12:59)  dinoprostone Insert: 10 ( 21:17)  enoxaparin Injectable: 40 ( @ 10:34),  40 ( @ 10:57)  NIFEdipine XL: 30 (06-15 @ 13:00)  NIFEdipine XL: 30 ( @ 06:15),  30 ( @ 06:36),  30 ( @ 05:50),  30 ( @ 06:04),  30 ( @ 08:02)    Labs:    none new    A/P:   42 y/o  at 33w4d GA, GBS positive, IVF regnancy, AMA, severe gestational hypertension vs cHTN with worsening BPs, s/p celestone, for IOL with pitocin  -continue pitocin per PMD at bedside  -pain management prn  -cont efm/toco  -f/u pending labs  -cont to monitor vitals  -cont iv hydration    Dr. Tovar at bedside PGY4 Note    Patient seen at bedside for evaluation of labor progression, doing well, no complaints.     T(F): 98.24 ( @ 14:31), Max: 98.78 ( @ 06:55)  HR: 57 ( @ 15:25)  BP: 147/84 ( @ 15:25) (123/66 - 172/79)  RR: 18 ( @ 06:55)  EFM: 130, mod malcolm, no accels  TOCO: q2-3 mins  SVE: 1.5/50/-3    Medications:  acetaminophen   Tablet ..: 650 ( @ 02:06)  acetaminophen   Tablet ..: 650 ( @ 11:40)  acetaminophen   Tablet ..: 650 ( @ 06:40)  acyclovir   Oral Tab/Cap: 400 ( @ 14:09),  400 ( @ 06:15),  400 ( @ 22:29),  400 ( @ 13:29),  400 ( @ 06:36),  400 ( @ 22:29),  400 ( @ 14:01),  400 ( @ 05:50),  400 ( @ 21:38),  400 ( @ 15:27),  400 ( @ 06:04),  400 ( @ 22:08),  400 ( @ 14:19),  400 ( @ 06:14),  400 (06-15 @ 21:51),  400 (06-15 @ 14:04),  400 (06-15 @ 06:11),  400 ( @ 21:45),  400 ( @ 14:50)  ampicillin  IVPB: 216 ( 21:25)  ampicillin  IVPB: 108 ( @ 13:28),  108 ( @ 09:29),  108 ( @ 05:25),  108 ( @ 01:25)  betamethasone Injectable: 12 ( @ 12:58),  12 ( @ 12:59)  dinoprostone Insert: 10 ( 21:17)  enoxaparin Injectable: 40 ( @ 10:34),  40 ( @ 10:57)  NIFEdipine XL: 30 (06-15 @ 13:00)  NIFEdipine XL: 30 ( @ 06:15),  30 ( @ 06:36),  30 ( @ 05:50),  30 ( @ 06:04),  30 ( @ 08:02)    Labs:    none new    A/P:   44 y/o  at 33w4d GA, GBS positive, IVF regnancy, AMA, severe gestational hypertension vs cHTN with worsening BPs, s/p celestone, for IOL with pitocin  -continue pitocin per PMD at bedside  -pain management prn  -cont efm/toco  -f/u pending labs  -cont to monitor vitals  -cont iv hydration    Dr. Tovar at bedside

## 2019-06-20 NOTE — BRIEF OPERATIVE NOTE - OPERATION/FINDINGS
Clear amniotic fluid. Live female infant delivered in vertex position, vacuum-assisted, APGARS 9, 9, weighing 1820g. Normal uterus and bilateral ovaries and tubes.

## 2019-06-20 NOTE — PROGRESS NOTE ADULT - SUBJECTIVE AND OBJECTIVE BOX
PGY 4 Note:    Patient seen at bedside for cervidil removal. VE 1/60/-2, vtx, intact. Will start pitocin in 1 hour. Plan to start Magnesium once patient in active labor. GBS positive, will start pitocin.     Vital Signs Last 24 Hrs  T(C): 37.1 (20 Jun 2019 06:55), Max: 37.1 (20 Jun 2019 06:55)  T(F): 98.78 (20 Jun 2019 06:55), Max: 98.78 (20 Jun 2019 06:55)  HR: 73 (20 Jun 2019 09:22) (54 - 94)  BP: 140/72 (20 Jun 2019 09:22) (126/75 - 172/79)  RR: 18 (20 Jun 2019 06:55) (18 - 18)  EFM: 120/mod/+accels  [place]: Occasional  VE: 1/60/-2, vtx, intact    Will inform Dr. Tovar

## 2019-06-20 NOTE — BRIEF OPERATIVE NOTE - NSICDXBRIEFPREOP_GEN_ALL_CORE_FT
PRE-OP DIAGNOSIS:  Fetal heart rate non-reassuring affecting management of mother 20-Jun-2019 21:48:43 NRFHT remote from delivery Shannon Colmenares  Gestational hypertension 20-Jun-2019 21:47:34 SEVERE Shannon Colmenares

## 2019-06-20 NOTE — OB RN INTRAOPERATIVE NOTE - NS_CELLSAVER _OBGYN_ALL_OB
I reviewed the H&P, I examined the patient, and there are no changes in the patient's condition.     No

## 2019-06-21 LAB
ALBUMIN SERPL ELPH-MCNC: 2.4 G/DL — LOW (ref 3.5–5.2)
ALP SERPL-CCNC: 83 U/L — SIGNIFICANT CHANGE UP (ref 30–115)
ALT FLD-CCNC: 9 U/L — SIGNIFICANT CHANGE UP (ref 0–41)
ANION GAP SERPL CALC-SCNC: 10 MMOL/L — SIGNIFICANT CHANGE UP (ref 7–14)
AST SERPL-CCNC: 23 U/L — SIGNIFICANT CHANGE UP (ref 0–41)
BASOPHILS # BLD AUTO: 0.01 K/UL — SIGNIFICANT CHANGE UP (ref 0–0.2)
BASOPHILS NFR BLD AUTO: 0 % — SIGNIFICANT CHANGE UP (ref 0–1)
BILIRUB SERPL-MCNC: 0.3 MG/DL — SIGNIFICANT CHANGE UP (ref 0.2–1.2)
BUN SERPL-MCNC: 14 MG/DL — SIGNIFICANT CHANGE UP (ref 10–20)
CALCIUM SERPL-MCNC: 7.2 MG/DL — LOW (ref 8.5–10.1)
CHLORIDE SERPL-SCNC: 105 MMOL/L — SIGNIFICANT CHANGE UP (ref 98–110)
CO2 SERPL-SCNC: 21 MMOL/L — SIGNIFICANT CHANGE UP (ref 17–32)
CREAT ?TM UR-MCNC: 204 MG/DL — SIGNIFICANT CHANGE UP
CREAT SERPL-MCNC: 0.7 MG/DL — SIGNIFICANT CHANGE UP (ref 0.7–1.5)
EOSINOPHIL # BLD AUTO: 0.01 K/UL — SIGNIFICANT CHANGE UP (ref 0–0.7)
EOSINOPHIL NFR BLD AUTO: 0 % — SIGNIFICANT CHANGE UP (ref 0–8)
GLUCOSE SERPL-MCNC: 95 MG/DL — SIGNIFICANT CHANGE UP (ref 70–99)
HCT VFR BLD CALC: 29.7 % — LOW (ref 37–47)
HGB BLD-MCNC: 9.5 G/DL — LOW (ref 12–16)
IMM GRANULOCYTES NFR BLD AUTO: 1.1 % — HIGH (ref 0.1–0.3)
LDH SERPL L TO P-CCNC: 299 — HIGH (ref 50–242)
LYMPHOCYTES # BLD AUTO: 1.25 K/UL — SIGNIFICANT CHANGE UP (ref 1.2–3.4)
LYMPHOCYTES # BLD AUTO: 5.9 % — LOW (ref 20.5–51.1)
MAGNESIUM SERPL-MCNC: 5.3 MG/DL — CRITICAL HIGH (ref 1.8–2.4)
MAGNESIUM SERPL-MCNC: 6.2 MG/DL — CRITICAL HIGH (ref 1.8–2.4)
MCHC RBC-ENTMCNC: 29.5 PG — SIGNIFICANT CHANGE UP (ref 27–31)
MCHC RBC-ENTMCNC: 32 G/DL — SIGNIFICANT CHANGE UP (ref 32–37)
MCV RBC AUTO: 92.2 FL — SIGNIFICANT CHANGE UP (ref 81–99)
MONOCYTES # BLD AUTO: 1.49 K/UL — HIGH (ref 0.1–0.6)
MONOCYTES NFR BLD AUTO: 7.1 % — SIGNIFICANT CHANGE UP (ref 1.7–9.3)
NEUTROPHILS # BLD AUTO: 18.05 K/UL — HIGH (ref 1.4–6.5)
NEUTROPHILS NFR BLD AUTO: 85.9 % — HIGH (ref 42.2–75.2)
NRBC # BLD: 0 /100 WBCS — SIGNIFICANT CHANGE UP (ref 0–0)
PLATELET # BLD AUTO: 186 K/UL — SIGNIFICANT CHANGE UP (ref 130–400)
POTASSIUM SERPL-MCNC: 4.6 MMOL/L — SIGNIFICANT CHANGE UP (ref 3.5–5)
POTASSIUM SERPL-SCNC: 4.6 MMOL/L — SIGNIFICANT CHANGE UP (ref 3.5–5)
PROT SERPL-MCNC: 4.6 G/DL — LOW (ref 6–8)
RBC # BLD: 3.22 M/UL — LOW (ref 4.2–5.4)
RBC # FLD: 14 % — SIGNIFICANT CHANGE UP (ref 11.5–14.5)
SODIUM SERPL-SCNC: 136 MMOL/L — SIGNIFICANT CHANGE UP (ref 135–146)
SODIUM UR-SCNC: 65 MMOL/L — SIGNIFICANT CHANGE UP
URATE SERPL-MCNC: 5.1 MG/DL — SIGNIFICANT CHANGE UP (ref 2.5–7)
WBC # BLD: 21.05 K/UL — HIGH (ref 4.8–10.8)
WBC # FLD AUTO: 21.05 K/UL — HIGH (ref 4.8–10.8)

## 2019-06-21 RX ORDER — SODIUM CHLORIDE 9 MG/ML
500 INJECTION, SOLUTION INTRAVENOUS ONCE
Refills: 0 | Status: DISCONTINUED | OUTPATIENT
Start: 2019-06-21 | End: 2019-06-25

## 2019-06-21 RX ORDER — IBUPROFEN 200 MG
600 TABLET ORAL EVERY 6 HOURS
Refills: 0 | Status: DISCONTINUED | OUTPATIENT
Start: 2019-06-21 | End: 2019-06-25

## 2019-06-21 RX ORDER — SODIUM CHLORIDE 9 MG/ML
500 INJECTION, SOLUTION INTRAVENOUS ONCE
Refills: 0 | Status: DISCONTINUED | OUTPATIENT
Start: 2019-06-21 | End: 2019-06-21

## 2019-06-21 RX ADMIN — Medication 30 MILLIGRAM(S): at 03:16

## 2019-06-21 RX ADMIN — ENOXAPARIN SODIUM 40 MILLIGRAM(S): 100 INJECTION SUBCUTANEOUS at 10:02

## 2019-06-21 RX ADMIN — Medication 975 MILLIGRAM(S): at 19:36

## 2019-06-21 RX ADMIN — Medication 30 MILLIGRAM(S): at 09:15

## 2019-06-21 RX ADMIN — Medication 100 MILLIGRAM(S): at 05:54

## 2019-06-21 RX ADMIN — Medication 100 MILLIGRAM(S): at 18:05

## 2019-06-21 RX ADMIN — Medication 30 MILLIGRAM(S): at 10:03

## 2019-06-21 RX ADMIN — Medication 600 MILLIGRAM(S): at 15:58

## 2019-06-21 RX ADMIN — Medication 975 MILLIGRAM(S): at 05:54

## 2019-06-21 RX ADMIN — Medication 975 MILLIGRAM(S): at 18:05

## 2019-06-21 RX ADMIN — SIMETHICONE 80 MILLIGRAM(S): 80 TABLET, CHEWABLE ORAL at 05:54

## 2019-06-21 RX ADMIN — Medication 600 MILLIGRAM(S): at 17:08

## 2019-06-21 RX ADMIN — Medication 975 MILLIGRAM(S): at 13:59

## 2019-06-21 RX ADMIN — Medication 975 MILLIGRAM(S): at 14:39

## 2019-06-21 RX ADMIN — SIMETHICONE 80 MILLIGRAM(S): 80 TABLET, CHEWABLE ORAL at 13:58

## 2019-06-21 RX ADMIN — Medication 975 MILLIGRAM(S): at 01:26

## 2019-06-21 RX ADMIN — Medication 600 MILLIGRAM(S): at 21:07

## 2019-06-21 NOTE — PROGRESS NOTE ADULT - ASSESSMENT
44 yo now , s/p primary  section for NRFHT remote from delivery, POD1, AMA, severe gHTN, on Mg for seizure prophylaxis, with low urine output, otherwise doing well.    - will bolus 500cc  - c/w Mg until  @2200  - pain management PRN  - seizure precautions  - BPs q15min  - strict I/Os, monitor UO  - lovenox for DVT ppx  - f/up placental path  - f/up AM Mg level, PELs    Dr. Butler aware and Dr. Tovar to be made aware

## 2019-06-21 NOTE — PROGRESS NOTE ADULT - ASSESSMENT
42 yo now , s/p primary  section for NRFHT remote from delivery, POD1, AMA, measles-nonimmune, severe gHTN, on Mg for seizure prophylaxis, with low urine output, NUZHAT with decreased O2 sat while sleeping improved with non-rebreather, otherwise doing well.  -s/p 1L bolu, UOP still low, encouraged PO hydratin, continue to monitor UOP. If continues to be low, can consider decreasing Mag. However low UOP likely dehydration.  -Incentive spirometry use encouraged, No O2 needed while awake.  - c/w Mg until  @2200  - pain management PRN  - seizure precautions  - BPs q15min  - strict I/Os, monitor UO  - lovenox for DVT ppx  - MMR ordered  - f/up placental path  -dressing change in PM    Dr. Tovar to be made aware

## 2019-06-21 NOTE — PROGRESS NOTE ADULT - ASSESSMENT
42 yo now , s/p primary  section for NRFHT remote from delivery, POD1, AMA, measles-nonimmune, severe gHTN, on Mg for seizure prophylaxis, with low urine output, otherwise doing well.    - 2nd 500cc bolus  - c/w Mg until  @2200  - pain management PRN  - seizure precautions  - BPs q15min  - strict I/Os, monitor UO  - lovenox for DVT ppx  - MMR ordered  - f/up placental path  - f/up AM Mg level, PELs, FeNa    Dr. Butler aware and Dr. Tovar to be made aware

## 2019-06-21 NOTE — PROGRESS NOTE ADULT - ASSESSMENT
Subjective:   Pt evaluated at bedside for magnesium check. She denies visual disturbances, headache and right upper quadrant pain. Also denies nausea/vomiting/chest pain/epigastric pain/shortness of breath. Pain well controlled.     Objective:   T(F): 98.2 ( @ 04:20), Max: 98.2 ( @ 04:20)  HR: 62 ( @ 10:11)  BP: 136/74 ( @ 10:05) (114/63 - 163/89)  RR: 18 ( @ 05:47)  SpO2: 89% ( @ 10:09)  Vital Signs Last 24 Hrs  BP: 136/74 (2019 10:05) (114/63 - 174/82)     @ 07:01  -   @ 07:00  --------------------------------------------------------  IN: 2325 mL / OUT: 295 mL / NET: 2030 mL     @ 07:01  -   @ 10:13  --------------------------------------------------------  IN: 1515 mL / OUT: 60 mL / NET: 1455 mL      Gen: NAD, AAOx3  CV: RRR, no M/R/G  Pulm: CTAB, no R/R/W  Abd: soft, gravid, nontender, no rebound or guarding, no epigastric tenderness, liver nonpalpable +BS.   : Quigley   Ext: +1 edema ricardo, SCDs in place  Neuro: Reflexes 2+ bilaterally upper and lower    Medications:  acetaminophen   Tablet .. 975 milliGRAM(s) Oral <User Schedule>  dexamethasone  Injectable 4 milliGRAM(s) IV Push every 6 hours PRN  diphenhydrAMINE 25 milliGRAM(s) Oral every 6 hours PRN  docusate sodium 100 milliGRAM(s) Oral two times a day  enoxaparin Injectable 40 milliGRAM(s) SubCutaneous daily  glycerin Suppository - Adult 1 Suppository(s) Rectal at bedtime PRN  ibuprofen  Tablet. 600 milliGRAM(s) Oral every 6 hours  lactated ringers Bolus 500 milliLiter(s) IV Bolus once  lactated ringers Bolus 500 milliLiter(s) IV Bolus once  lactated ringers Bolus 500 milliLiter(s) IV Bolus once  lactated ringers. 1000 milliLiter(s) IV Continuous <Continuous>  lactated ringers. 1000 milliLiter(s) IV Continuous <Continuous>  lanolin Ointment 1 Application(s) Topical every 6 hours PRN  magnesium hydroxide Suspension 30 milliLiter(s) Oral two times a day PRN  magnesium sulfate Infusion 2 Gm/Hr IV Continuous <Continuous>  measles/mumps/rubella Vaccine 0.5 milliLiter(s) SubCutaneous once  naloxone Injectable 0.1 milliGRAM(s) IV Push every 3 minutes PRN  ondansetron Injectable 4 milliGRAM(s) IV Push every 6 hours PRN  oxyCODONE    IR 5 milliGRAM(s) Oral every 3 hours PRN  oxyCODONE    IR 5 milliGRAM(s) Oral once PRN  oxytocin Infusion 1 milliUNIT(s)/Min IV Continuous <Continuous>  oxytocin Infusion 41.667 milliUNIT(s)/Min IV Continuous <Continuous>  simethicone 80 milliGRAM(s) Chew three times a day    No Known Allergies      Labs:                        9.5    .05 )-----------( 186      ( 2019 05:00 )             29.7         136  |  105  |  14  ----------------------------<  95  4.6   |  21  |  0.7    Ca    7.2<L>      2019 05:00  Mg     5.3         TPro  4.6<L>  /  Alb  2.4<L>  /  TBili  0.3  /  DBili  x   /  AST  23  /  ALT  9   /  AlkPhos  83      Magnesium, Serum: 5.3 mg/dL ( @ 05:00)  Uric Acid, Serum: 5.1 mg/dL ( @ 05:00)      Assessment:  44 yo now , s/p primary  section for NRFHT remote from delivery, POD1, AMA, measles-nonimmune, severe gHTN, on Mg for seizure prophylaxis, with low urine output, NUZHAT with decreased O2 sat while sleeping improved with non-rebreather, otherwise doing well.    -s/p 1L bolu, UOP still low, encouraged PO hydratin, continue to monitor UOP. If continues to be low, can consider decreasing Mag. However low UOP likely dehydration.  -Incentive spirometry use encouraged, No O2 needed while awake.  - c/w Mg until  @2200  - pain management PRN  - seizure precautions  - BPs q15min  - strict I/Os, monitor UO  - lovenox for DVT ppx  - MMR ordered  - f/up placental path  -dressing change in PM    Dr. Tovar to be made aware       Plan:  -Continue magnesium until:  -Repeat Mag level at  -Neuro checks q2h  -Cont to monitor BPs and I/Os  -Pain management prn  -Cont seizure precautions  -Cont efm/toco

## 2019-06-21 NOTE — PROGRESS NOTE ADULT - SUBJECTIVE AND OBJECTIVE BOX
Subjective:   Pt evaluated at bedside for magnesium check. She denies visual disturbances, headache and right upper quadrant pain. Also denies nausea/vomiting/chest pain/epigastric pain/shortness of breath. Pain well controlled.     Objective:   T(F): 98 ( @ 18:05), Max: 98 ( @ 18:05)  HR: 60 ( @ 18:56)  BP: 124/72 ( @ 18:50) (114/69 - 144/85)  RR: 20 ( @ 18:05)  SpO2: 99% ( @ 18:56)  Vital Signs Last 24 Hrs  BP: 124/72 (2019 18:50) (114/63 - 174/82)     @ 07:01  -   @ 19:01  --------------------------------------------------------  IN: 2876 mL / OUT: 560 mL / NET: 2316 mL      Gen: NAD, AAOx3  CV: RRR, no M/R/G  Pulm: CTAB, no R/R/W  Abd: soft, gravid, nontender, no rebound or guarding, no epigastric tenderness, liver nonpalpable +BS.   : Quigley   Ext: +1 edema ricardo, SCDs in place  Neuro: Reflexes 2+ bilaterally upper and lower    Medications:  acetaminophen   Tablet .. 975 milliGRAM(s) Oral <User Schedule>  dexamethasone  Injectable 4 milliGRAM(s) IV Push every 6 hours PRN  diphenhydrAMINE 25 milliGRAM(s) Oral every 6 hours PRN  docusate sodium 100 milliGRAM(s) Oral two times a day  enoxaparin Injectable 40 milliGRAM(s) SubCutaneous daily  glycerin Suppository - Adult 1 Suppository(s) Rectal at bedtime PRN  ibuprofen  Tablet. 600 milliGRAM(s) Oral every 6 hours  lactated ringers Bolus 500 milliLiter(s) IV Bolus once  lactated ringers Bolus 500 milliLiter(s) IV Bolus once  lactated ringers Bolus 500 milliLiter(s) IV Bolus once  lactated ringers. 1000 milliLiter(s) IV Continuous <Continuous>  lactated ringers. 1000 milliLiter(s) IV Continuous <Continuous>  lanolin Ointment 1 Application(s) Topical every 6 hours PRN  magnesium hydroxide Suspension 30 milliLiter(s) Oral two times a day PRN  magnesium sulfate Infusion 2 Gm/Hr IV Continuous <Continuous>  measles/mumps/rubella Vaccine 0.5 milliLiter(s) SubCutaneous once  naloxone Injectable 0.1 milliGRAM(s) IV Push every 3 minutes PRN  ondansetron Injectable 4 milliGRAM(s) IV Push every 6 hours PRN  oxyCODONE    IR 5 milliGRAM(s) Oral every 3 hours PRN  oxyCODONE    IR 5 milliGRAM(s) Oral once PRN  oxytocin Infusion 1 milliUNIT(s)/Min IV Continuous <Continuous>  oxytocin Infusion 41.667 milliUNIT(s)/Min IV Continuous <Continuous>  simethicone 80 milliGRAM(s) Chew three times a day    No Known Allergies      Labs:                        9.5    21.05 )-----------( 186      ( 2019 05:00 )             29.7         136  |  105  |  14  ----------------------------<  95  4.6   |  21  |  0.7    Ca    7.2<L>      2019 05:00  Mg     6.2         TPro  4.6<L>  /  Alb  2.4<L>  /  TBili  0.3  /  DBili  x   /  AST  23  /  ALT  9   /  AlkPhos  83      Magnesium, Serum: 6.2 mg/dL ( @ 13:20)  Magnesium, Serum: 5.3 mg/dL ( @ 05:00)  Uric Acid, Serum: 5.1 mg/dL ( @ 05:00)      Assessment:  42 yo now , s/p primary  section for NRFHT remote from delivery, POD1, AMA, measles-nonimmune, severe gHTN, on Mg for seizure prophylaxis, with low urine output, NUZHAT with decreased O2 sat while sleeping improved with non-rebreather, otherwise doing well.  - c/w Mg until  @2200  - pain management PRN  - seizure precautions  - BPs q15min  - strict I/Os, monitor UO  - lovenox for DVT ppx  - MMR ordered  - f/up placental path    Dr. Tovar aware

## 2019-06-21 NOTE — PROGRESS NOTE ADULT - SUBJECTIVE AND OBJECTIVE BOX
Subjective:   Pt evaluated at bedside for magnesium check. She denies visual disturbances, headache and right upper quadrant pain. Also denies nausea/vomiting/chest pain/epigastric pain/shortness of breath. Pain well controlled.     Objective:   T(F): 98.2 ( @ 04:20), Max: 98.2 ( @ 04:20)  HR: 62 ( @ 10:11)  BP: 136/74 ( @ 10:05) (114/63 - 163/89)  RR: 18 ( @ 05:47)  SpO2: 89% ( @ 10:09)  Vital Signs Last 24 Hrs  BP: 136/74 (2019 10:05) (114/63 - 174/82)     @ 07:01  -   @ 07:00  --------------------------------------------------------  IN: 2325 mL / OUT: 295 mL / NET: 2030 mL     @ 07:01  -   @ 10:13  --------------------------------------------------------  IN: 1515 mL / OUT: 60 mL / NET: 1455 mL      Gen: NAD, AAOx3  CV: RRR, no M/R/G  Pulm: CTAB, no R/R/W  Abd: soft, gravid, nontender, no rebound or guarding, no epigastric tenderness, liver nonpalpable +BS.   : Quigley   Ext: +1 edema ricardo, SCDs in place  Neuro: Reflexes 2+ bilaterally upper and lower    Medications:  acetaminophen   Tablet .. 975 milliGRAM(s) Oral <User Schedule>  dexamethasone  Injectable 4 milliGRAM(s) IV Push every 6 hours PRN  diphenhydrAMINE 25 milliGRAM(s) Oral every 6 hours PRN  docusate sodium 100 milliGRAM(s) Oral two times a day  enoxaparin Injectable 40 milliGRAM(s) SubCutaneous daily  glycerin Suppository - Adult 1 Suppository(s) Rectal at bedtime PRN  ibuprofen  Tablet. 600 milliGRAM(s) Oral every 6 hours  lactated ringers Bolus 500 milliLiter(s) IV Bolus once  lactated ringers Bolus 500 milliLiter(s) IV Bolus once  lactated ringers Bolus 500 milliLiter(s) IV Bolus once  lactated ringers. 1000 milliLiter(s) IV Continuous <Continuous>  lactated ringers. 1000 milliLiter(s) IV Continuous <Continuous>  lanolin Ointment 1 Application(s) Topical every 6 hours PRN  magnesium hydroxide Suspension 30 milliLiter(s) Oral two times a day PRN  magnesium sulfate Infusion 2 Gm/Hr IV Continuous <Continuous>  measles/mumps/rubella Vaccine 0.5 milliLiter(s) SubCutaneous once  naloxone Injectable 0.1 milliGRAM(s) IV Push every 3 minutes PRN  ondansetron Injectable 4 milliGRAM(s) IV Push every 6 hours PRN  oxyCODONE    IR 5 milliGRAM(s) Oral every 3 hours PRN  oxyCODONE    IR 5 milliGRAM(s) Oral once PRN  oxytocin Infusion 1 milliUNIT(s)/Min IV Continuous <Continuous>  oxytocin Infusion 41.667 milliUNIT(s)/Min IV Continuous <Continuous>  simethicone 80 milliGRAM(s) Chew three times a day    No Known Allergies      Labs:                        9.5    .05 )-----------( 186      ( 2019 05:00 )             29.7         136  |  105  |  14  ----------------------------<  95  4.6   |  21  |  0.7    Ca    7.2<L>      2019 05:00  Mg     5.3         TPro  4.6<L>  /  Alb  2.4<L>  /  TBili  0.3  /  DBili  x   /  AST  23  /  ALT  9   /  AlkPhos  83      Magnesium, Serum: 5.3 mg/dL ( @ 05:00)  Uric Acid, Serum: 5.1 mg/dL ( @ 05:00)      Assessment:  44 yo now , s/p primary  section for NRFHT remote from delivery, POD1, AMA, measles-nonimmune, severe gHTN, on Mg for seizure prophylaxis, with low urine output, NUZHAT with decreased O2 sat while sleeping improved with non-rebreather, otherwise doing well.    -s/p 1L bolu, UOP still low, encouraged PO hydratin, continue to monitor UOP. If continues to be low, can consider decreasing Mag. However low UOP likely dehydration.  -Incentive spirometry use encouraged, No O2 needed while awake.  - c/w Mg until  @2200  - pain management PRN  - seizure precautions  - BPs q15min  - strict I/Os, monitor UO  - lovenox for DVT ppx  - MMR ordered  - f/up placental path  -dressing change in PM    Dr. Tovar aware

## 2019-06-21 NOTE — PROGRESS NOTE ADULT - ASSESSMENT
43y.o.  POD #1 s/p Primary  for NRFHR remote from delivery, Preeclampsia with severe features, AMA, Measles non-immune, NUZHAT  - Continue magnesium  - Serial BP's  - Strict I&O's  - Pain management PRN  - Encourage incentive spirometer  - Dr. Kenney/ Dr. Tovar aware

## 2019-06-21 NOTE — PROGRESS NOTE ADULT - SUBJECTIVE AND OBJECTIVE BOX
PGY II Note    Subjective: Pt evaluated at bedside for magnesium check. She denies HA, blurry vision, CP, SOB, N/V, RUQ/epigastric pain, LE pain. Pain controlled.     Objective:   Vital Signs Last 24 Hrs  T(F): 97.7 (20 Jun 2019 22:57), Max: 98.78 (20 Jun 2019 06:55)  HR: 64 (21 Jun 2019 04:55) (48 - 85)  BP: 119/73 (21 Jun 2019 04:54) (118/71 - 174/82)  RR: 18 (21 Jun 2019 04:21) (16 - 18)  SpO2: 97% (21 Jun 2019 05:00) (89% - 99%)    UO: (6794-0647): 5/25/30(000-0130)/15(1715-7115)--> 500cc bolus-->35/20/30    Gen: NAD, AAOx3  CV: RRR, no M/R/G  Pulm: CTAB, no R/R/W  Abd: soft, nontender, no RUQ/epigastric tenderness  : Quigley draining concentrated  Ext: +1 edema ricardo, SCDs in place  Neuro: Reflexes 2+ b/l UE    Medications:  @2200 Mg started, 2gm/hr PGY II Note    Subjective: Pt evaluated at bedside for magnesium check. She denies HA, blurry vision, CP, SOB, N/V, RUQ/epigastric pain, LE pain. Pain controlled.     Objective:   Vital Signs Last 24 Hrs  T(F): 97.7 (20 Jun 2019 22:57), Max: 98.78 (20 Jun 2019 06:55)  HR: 64 (21 Jun 2019 04:55) (48 - 85)  BP: 119/73 (21 Jun 2019 04:54) (118/71 - 174/82)  RR: 18 (21 Jun 2019 04:21) (16 - 18)  SpO2: 97% (21 Jun 2019 05:00) (89% - 99%)    UO: (1222-8557): 5/25/30(000-0130)/15(6873-4758)--> 500cc bolus-->35/20/35    Gen: NAD, AAOx3  CV: RRR, no M/R/G  Pulm: CTAB, no R/R/W  Abd: soft, nontender, no RUQ/epigastric tenderness  : Quigley draining concentrated  Ext: +1 edema ricardo, SCDs in place  Neuro: Reflexes 2+ b/l UE    Medications:  @2200 Mg started, 2gm/hr

## 2019-06-21 NOTE — PROGRESS NOTE ADULT - SUBJECTIVE AND OBJECTIVE BOX
PGY II Note    Subjective: Pt evaluated at bedside for magnesium check. She denies HA, blurry vision, CP, SOB, N/V, RUQ/epigastric pain, LE pain. Pain controlled.     Objective:   T(F): 97.7 (06-20 @ 22:57), Max: 98.7 (06-20 @ 21:20)  HR: 69 (06-21 @ 01:20)  BP: 138/85 (06-21 @ 01:20) (122/60 - 174/82)  RR: 18 (06-21 @ 01:20)  SpO2: 94% (06-21 @ 01:25)  Vital Signs Last 24 Hrs  BP: 138/85 (21 Jun 2019 01:20) (122/60 - 174/82)    06-20 @ 07:01  -  06-21 @ 01:26  --------------------------------------------------------  IN: 375 mL / OUT: 130 mL / NET: 245 mL      EFM:  TOCO:  SVE:  Gen: NAD, AAOx3  CV: RRR, no M/R/G  Pulm: CTAB, no R/R/W  Abd: soft, gravid, nontender, no rebound or guarding, no epigastric tenderness, liver nonpalpable +BS.   : Quigley   Ext: +1 edema ricardo, SCDs in place  Neuro: Reflexes 2+ bilaterally upper and lower    Medications:  acetaminophen   Tablet .. 975 milliGRAM(s) Oral <User Schedule>  dexamethasone  Injectable 4 milliGRAM(s) IV Push every 6 hours PRN  diphenhydrAMINE 25 milliGRAM(s) Oral every 6 hours PRN  docusate sodium 100 milliGRAM(s) Oral two times a day  enoxaparin Injectable 40 milliGRAM(s) SubCutaneous daily  glycerin Suppository - Adult 1 Suppository(s) Rectal at bedtime PRN  ibuprofen  Tablet. 600 milliGRAM(s) Oral every 6 hours  ketorolac   Injectable 30 milliGRAM(s) IV Push every 6 hours  lactated ringers. 1000 milliLiter(s) IV Continuous <Continuous>  lactated ringers. 1000 milliLiter(s) IV Continuous <Continuous>  lanolin Ointment 1 Application(s) Topical every 6 hours PRN  magnesium hydroxide Suspension 30 milliLiter(s) Oral two times a day PRN  magnesium sulfate Infusion 2 Gm/Hr IV Continuous <Continuous>  naloxone Injectable 0.1 milliGRAM(s) IV Push every 3 minutes PRN  ondansetron Injectable 4 milliGRAM(s) IV Push every 6 hours PRN  oxyCODONE    IR 5 milliGRAM(s) Oral every 3 hours PRN  oxyCODONE    IR 5 milliGRAM(s) Oral once PRN  oxytocin Infusion 1 milliUNIT(s)/Min IV Continuous <Continuous>  oxytocin Infusion 41.667 milliUNIT(s)/Min IV Continuous <Continuous>  simethicone 80 milliGRAM(s) Chew three times a day    No Known Allergies      Labs:              Assessment:  43y G P at [facility],  on magnesium for preeclampsia with/without severe features    Plan:  -Continue magnesium until:  -Repeat Mag level at  -Neuro checks q2h  -Cont to monitor BPs and I/Os  -Pain management prn  -Cont seizure precautions  -Cont efm/toco PGY II Note    Subjective: Pt evaluated at bedside for magnesium check. She denies HA, blurry vision, CP, SOB, N/V, RUQ/epigastric pain, LE pain. Pain controlled.     Objective:   Vital Signs Last 24 Hrs  T(F): 97.7 (20 Jun 2019 22:57), Max: 98.78 (20 Jun 2019 06:55)  HR: 74 (21 Jun 2019 01:25) (48 - 85)  BP: 138/85 (21 Jun 2019 01:20) (122/60 - 174/82)  RR: 18 (21 Jun 2019 01:20) (16 - 18)  SpO2: 91% (21 Jun 2019 01:25) (89% - 98%)    UO: (9899-7028): 5/25/30    Gen: NAD, AAOx3  CV: RRR, no M/R/G  Pulm: CTAB, no R/R/W  Abd: soft, nontender, no RUQ/epigastric tenderness  : Quigley draining concentrated  Ext: +1 edema ricardo, SCDs in place  Neuro: Reflexes 2+ b/l UE    Medications:  @2200 Mg started, 2gm/hr

## 2019-06-21 NOTE — PROGRESS NOTE ADULT - SUBJECTIVE AND OBJECTIVE BOX
S: Pt evaluated at bedside for magnesium check. She denies visual disturbances, headache and right upper quadrant pain. Also denies nausea/vomiting/chest pain/epigastric pain/shortness of breath. Pain well controlled.     O:  T(C): 36.8 (06-21-19 @ 04:20), Max: 36.8 (06-21-19 @ 04:20)  HR: 76 (06-21-19 @ 11:56) (57 - 85)  BP: 114/69 (06-21-19 @ 11:50) (114/63 - 144/75)  RR: 18 (06-21-19 @ 05:47) (18 - 18)  SpO2: 98% (06-21-19 @ 11:56) (85% - 99%)    Vital Signs Last 24 Hrs  BP: 114/69 (21 Jun 2019 11:50) (114/63 - 174/82)      06-20 @ 07:01  -  06-21 @ 07:00  --------------------------------------------------------  IN: 2325 mL / OUT: 295 mL / NET: 2030 mL    06-21 @ 07:01  -  06-21 @ 11:59  --------------------------------------------------------  IN: 1758 mL / OUT: 105 mL / NET: 1653 mL      Gen: NAD, AAOx3  CV: RRR, no M/R/G  Pulm: CTAB, no R/R/W  Abd: soft, nontender, no rebound or guarding, no epigastric tenderness, neg RUQ tenderness  Ext: +1 edema ricardo, SCDs in place  Neuro: Reflexes 2+ B/L    acetaminophen   Tablet .. 975 milliGRAM(s) Oral <User Schedule>  dexamethasone  Injectable 4 milliGRAM(s) IV Push every 6 hours PRN  diphenhydrAMINE 25 milliGRAM(s) Oral every 6 hours PRN  docusate sodium 100 milliGRAM(s) Oral two times a day  enoxaparin Injectable 40 milliGRAM(s) SubCutaneous daily  glycerin Suppository - Adult 1 Suppository(s) Rectal at bedtime PRN  ibuprofen  Tablet. 600 milliGRAM(s) Oral every 6 hours  lactated ringers Bolus 500 milliLiter(s) IV Bolus once  lactated ringers Bolus 500 milliLiter(s) IV Bolus once  lactated ringers Bolus 500 milliLiter(s) IV Bolus once  lactated ringers. 1000 milliLiter(s) IV Continuous <Continuous>  lactated ringers. 1000 milliLiter(s) IV Continuous <Continuous>  lanolin Ointment 1 Application(s) Topical every 6 hours PRN  magnesium hydroxide Suspension 30 milliLiter(s) Oral two times a day PRN  magnesium sulfate Infusion 2 Gm/Hr IV Continuous <Continuous>  measles/mumps/rubella Vaccine 0.5 milliLiter(s) SubCutaneous once  naloxone Injectable 0.1 milliGRAM(s) IV Push every 3 minutes PRN  ondansetron Injectable 4 milliGRAM(s) IV Push every 6 hours PRN  oxyCODONE    IR 5 milliGRAM(s) Oral every 3 hours PRN  oxyCODONE    IR 5 milliGRAM(s) Oral once PRN  oxytocin Infusion 1 milliUNIT(s)/Min IV Continuous <Continuous>  oxytocin Infusion 41.667 milliUNIT(s)/Min IV Continuous <Continuous>  simethicone 80 milliGRAM(s) Chew three times a day    No Known Allergies                            9.5    21.05 )-----------( 186      ( 21 Jun 2019 05:00 )             29.7     06-21    136  |  105  |  14  ----------------------------<  95  4.6   |  21  |  0.7    Ca    7.2<L>      21 Jun 2019 05:00  Mg     5.3     06-21    TPro  4.6<L>  /  Alb  2.4<L>  /  TBili  0.3  /  DBili  x   /  AST  23  /  ALT  9   /  AlkPhos  83  06-21

## 2019-06-21 NOTE — PROGRESS NOTE ADULT - SUBJECTIVE AND OBJECTIVE BOX
PGY 3 Post Op c/s note    Pt seen and evaluated at bedside, POD1, recovering well. On magnesium for seizure prophylaxis. Denies headaches, vision changes, lightheadedness, CP, SOB, palpitations, RUQ/epigastric pain, or increased swelling. Decreased UOP overnight, s/p 500cc bolus x2 (last ended at 0530). Ha hx of NUZHAT, O2 sat low while sleeping, on O2 non rebreather mask with sleeping. While awake saturating well.  Pt is bedrest, tolerating clears  Quigley in place, urine concentrating, passing gas, no BM yet.  Breast pump.    Physical Exam  T(F): 98.2 (21 Jun 2019 04:20), Max: 98.78 (20 Jun 2019 06:55)  HR: 72 (21 Jun 2019 06:05) (48 - 85)  BP: 128/70 (21 Jun 2019 05:50) (118/71 - 174/82)  RR: 18 (21 Jun 2019 05:47) (16 - 18)  SpO2: 96% (21 Jun 2019 06:00) (87% - 99%)    UOP 2035cc/h, minimum 48cc/h    Gen: NAD  CVS: RRR, normal S1, S2  Lungs: CTAB  Abdomen: soft, non tender, non distended, +BS  -Incision: dressing in place, no surrounding tenderness or erythema, C/D/I  -Fundus: firm, non tender, below umbilicus  LE: 1+ edema bilaterally, no tenderness  Lochia: Minimal Rubra  Neuri: 2+ DTR in bilateral upper extremity    Labs                        9.5    21.05 )-----------( 186      ( 21 Jun 2019 05:00 )             29.7                         11.8   13.39 )-----------( 172      ( 18 Jun 2019 01:30 )             36.8                         10.8   11.07 )-----------( 171      ( 15 Gelacio 2019 04:30 )             33.3                         11.6   13.03 )-----------( 199      ( 14 Jun 2019 05:40 )             35.9     06-21    136  |  105  |  14  ----------------------------<  95  4.6   |  21  |  0.7    Ca    7.2<L>      21 Jun 2019 05:00  Mg     5.3     06-21    TPro  4.6<L>  /  Alb  2.4<L>  /  TBili  0.3  /  DBili  x   /  AST  23  /  ALT  9   /  AlkPhos  83  06-21      Meds  acetaminophen   Tablet .. 975 milliGRAM(s) Oral <User Schedule>  dexamethasone  Injectable 4 milliGRAM(s) IV Push every 6 hours PRN  diphenhydrAMINE 25 milliGRAM(s) Oral every 6 hours PRN  docusate sodium 100 milliGRAM(s) Oral two times a day  enoxaparin Injectable 40 milliGRAM(s) SubCutaneous daily  glycerin Suppository - Adult 1 Suppository(s) Rectal at bedtime PRN  ibuprofen  Tablet. 600 milliGRAM(s) Oral every 6 hours  ketorolac   Injectable 30 milliGRAM(s) IV Push every 6 hours  lactated ringers Bolus 500 milliLiter(s) IV Bolus once  lanolin Ointment 1 Application(s) Topical every 6 hours PRN  magnesium hydroxide Suspension 30 milliLiter(s) Oral two times a day PRN  magnesium sulfate Infusion 2 Gm/Hr IV Continuous <Continuous>  measles/mumps/rubella Vaccine 0.5 milliLiter(s) SubCutaneous once  naloxone Injectable 0.1 milliGRAM(s) IV Push every 3 minutes PRN  ondansetron Injectable 4 milliGRAM(s) IV Push every 6 hours PRN  oxyCODONE    IR 5 milliGRAM(s) Oral every 3 hours PRN  oxyCODONE    IR 5 milliGRAM(s) Oral once PRN  simethicone 80 milliGRAM(s) Chew three times a day

## 2019-06-22 RX ORDER — HYDROCORTISONE 1 %
1 OINTMENT (GRAM) TOPICAL THREE TIMES A DAY
Refills: 0 | Status: DISCONTINUED | OUTPATIENT
Start: 2019-06-22 | End: 2019-06-25

## 2019-06-22 RX ADMIN — SIMETHICONE 80 MILLIGRAM(S): 80 TABLET, CHEWABLE ORAL at 13:24

## 2019-06-22 RX ADMIN — Medication 975 MILLIGRAM(S): at 21:20

## 2019-06-22 RX ADMIN — Medication 100 MILLIGRAM(S): at 06:56

## 2019-06-22 RX ADMIN — SIMETHICONE 80 MILLIGRAM(S): 80 TABLET, CHEWABLE ORAL at 06:56

## 2019-06-22 RX ADMIN — SIMETHICONE 80 MILLIGRAM(S): 80 TABLET, CHEWABLE ORAL at 21:21

## 2019-06-22 RX ADMIN — Medication 600 MILLIGRAM(S): at 06:56

## 2019-06-22 RX ADMIN — Medication 975 MILLIGRAM(S): at 02:03

## 2019-06-22 RX ADMIN — ENOXAPARIN SODIUM 40 MILLIGRAM(S): 100 INJECTION SUBCUTANEOUS at 11:55

## 2019-06-22 NOTE — PROGRESS NOTE ADULT - ASSESSMENT
42 yo @ , s/p primary  section for NRFHT remote from delivery, POD2, AMA, measles-nonimmune, severe gHTN, s/p Mg for seizure prophylaxis, BP well controlled off procardia  -will send to 4D off procardia for now  -f/u BPs  -pain mangement  -regular diet  -ambulation  -lovenox    Dr. Tovar to be made aware

## 2019-06-22 NOTE — PROGRESS NOTE ADULT - ASSESSMENT
A/P: 44yo  s/p primary LTCS for NRFHR remote from delivery POD#2 with PMH of AMA, measles non-immune, severe gHTN s/p magnesium for seizure prophylaxis. Recovering well    - monitor BP  - encourage ambulation  - PO hydration  - Continue Diet as tolerated  - lovenox for DVT ppx   -Incentive Spirometry bedside     Dr. Guzman aware and Dr. Tovar to be made aware.

## 2019-06-22 NOTE — PROGRESS NOTE ADULT - SUBJECTIVE AND OBJECTIVE BOX
PGY 4 note    patient is s/p Mg. Ambulated, voided, tolerated PO.  NO headache blurry vision, chest pain, SOB, RUQ/epigastric pain.  Abd pain well controlled.    VS  ICU Vital Signs Last 24 Hrs  T(C): 36.6 (21 Jun 2019 19:10), Max: 36.8 (21 Jun 2019 04:20)  T(F): 97.88 (21 Jun 2019 19:10), Max: 98.2 (21 Jun 2019 04:20)  HR: 66 (22 Jun 2019 00:12) (56 - 85)  BP: 134/74 (22 Jun 2019 00:05) (114/63 - 153/91)  BP(mean): --  ABP: --  ABP(mean): --  RR: 20 (21 Jun 2019 18:05) (18 - 20)  SpO2: 97% (22 Jun 2019 00:12) (79% - 100%)    GEN: NAD  Heart: RRR  Lungs: CTAB  Abd: soft, nontender, fundus firm below umbilicus  SVE: deferred    labs  6/21 @0500 21.05>9.5/29.7<186, 136/4.6/105/21/14/0.7<95, AST/ALT 23/9, uric acid 5.1, , Mg 5.2, Fena 0.2%    medications  s/p Magnesium for seizure prophylaxis

## 2019-06-22 NOTE — PROGRESS NOTE ADULT - SUBJECTIVE AND OBJECTIVE BOX
ABDIRIZAK VILLARREAL  43y  Female    PGY1 Note:  Patient seen and examined bedside. No overnight events. Denies heavy vaginal bleeding and reports pain well controlled. Ambulating without difficulty. Tolerating Diet. Reports +flatus. Denies headaches, blurry vision, chest pain.      MEDICATIONS  (STANDING):  acetaminophen   Tablet .. 975 milliGRAM(s) Oral <User Schedule>  docusate sodium 100 milliGRAM(s) Oral two times a day  enoxaparin Injectable 40 milliGRAM(s) SubCutaneous daily  ibuprofen  Tablet. 600 milliGRAM(s) Oral every 6 hours  lactated ringers Bolus 500 milliLiter(s) IV Bolus once  measles/mumps/rubella Vaccine 0.5 milliLiter(s) SubCutaneous once  oxytocin Infusion 1 milliUNIT(s)/Min (1 mL/Hr) IV Continuous <Continuous>  oxytocin Infusion 41.667 milliUNIT(s)/Min (125 mL/Hr) IV Continuous <Continuous>  simethicone 80 milliGRAM(s) Chew three times a day    MEDICATIONS  (PRN):  dexamethasone  Injectable 4 milliGRAM(s) IV Push every 6 hours PRN Nausea  diphenhydrAMINE 25 milliGRAM(s) Oral every 6 hours PRN Itching  glycerin Suppository - Adult 1 Suppository(s) Rectal at bedtime PRN Constipation  lanolin Ointment 1 Application(s) Topical every 6 hours PRN Sore Nipples  magnesium hydroxide Suspension 30 milliLiter(s) Oral two times a day PRN Constipation  naloxone Injectable 0.1 milliGRAM(s) IV Push every 3 minutes PRN For ANY of the following changes in patient status:  A. Breaths Per Minute LESS THAN 10, B. Oxygen saturation LESS THAN 90%, C. Sedation score of 6 for Stop After: 4 Times  ondansetron Injectable 4 milliGRAM(s) IV Push every 6 hours PRN Nausea  oxyCODONE    IR 5 milliGRAM(s) Oral every 3 hours PRN Moderate to Severe Pain (4-10)  oxyCODONE    IR 5 milliGRAM(s) Oral once PRN Moderate to Severe Pain (4-10)      PAST MEDICAL & SURGICAL HISTORY:  No pertinent past medical history  H/O ovarian cystectomy: laparoscopic  H/O laparoscopy: for liver cyst removal      Physical Exam  Vital Signs Last 24 Hrs  T(F): 97.8 (22 Jun 2019 07:58), Max: 98 (21 Jun 2019 18:05)  HR: 72 (22 Jun 2019 07:58) (56 - 81)  BP: 146/72 (22 Jun 2019 07:58) (115/71 - 153/91)  RR: 19 (22 Jun 2019 07:58) (18 - 20)    Gen: AAOx3, NAD  CV: RRR. No murmors gallops or rubs.  Pulm: CTAB. No wheezes or rales.  Ext: No calf tenderness, no swelling.   Abd: Soft, nontender, nondistended, BS+  Fundus firm, and below umbilicus. Nontender.   Lochia: Minimal, rubria  Wound: incision clean, dry intact. Steris in place. No surrounding edema or erythema.       Labs:                          9.5    21.05 )-----------( 186      ( 21 Jun 2019 05:00 )             29.7                         11.8   13.39 )-----------( 172      ( 18 Jun 2019 01:30 )             36.8

## 2019-06-23 LAB
HCT VFR BLD CALC: 26.9 % — LOW (ref 37–47)
HGB BLD-MCNC: 8.6 G/DL — LOW (ref 12–16)
MCHC RBC-ENTMCNC: 29.4 PG — SIGNIFICANT CHANGE UP (ref 27–31)
MCHC RBC-ENTMCNC: 32 G/DL — SIGNIFICANT CHANGE UP (ref 32–37)
MCV RBC AUTO: 91.8 FL — SIGNIFICANT CHANGE UP (ref 81–99)
NRBC # BLD: 0 /100 WBCS — SIGNIFICANT CHANGE UP (ref 0–0)
PLATELET # BLD AUTO: 180 K/UL — SIGNIFICANT CHANGE UP (ref 130–400)
RBC # BLD: 2.93 M/UL — LOW (ref 4.2–5.4)
RBC # FLD: 14.5 % — SIGNIFICANT CHANGE UP (ref 11.5–14.5)
WBC # BLD: 11.58 K/UL — HIGH (ref 4.8–10.8)
WBC # FLD AUTO: 11.58 K/UL — HIGH (ref 4.8–10.8)

## 2019-06-23 RX ORDER — NIFEDIPINE 30 MG
60 TABLET, EXTENDED RELEASE 24 HR ORAL EVERY 24 HOURS
Refills: 0 | Status: DISCONTINUED | OUTPATIENT
Start: 2019-06-23 | End: 2019-06-25

## 2019-06-23 RX ORDER — NIFEDIPINE 30 MG
30 TABLET, EXTENDED RELEASE 24 HR ORAL ONCE
Refills: 0 | Status: COMPLETED | OUTPATIENT
Start: 2019-06-23 | End: 2019-06-23

## 2019-06-23 RX ORDER — NIFEDIPINE 30 MG
60 TABLET, EXTENDED RELEASE 24 HR ORAL DAILY
Refills: 0 | Status: DISCONTINUED | OUTPATIENT
Start: 2019-06-24 | End: 2019-06-25

## 2019-06-23 RX ADMIN — SIMETHICONE 80 MILLIGRAM(S): 80 TABLET, CHEWABLE ORAL at 06:06

## 2019-06-23 RX ADMIN — Medication 1 APPLICATION(S): at 06:07

## 2019-06-23 RX ADMIN — Medication 100 MILLIGRAM(S): at 18:00

## 2019-06-23 RX ADMIN — Medication 30 MILLIGRAM(S): at 19:27

## 2019-06-23 RX ADMIN — SIMETHICONE 80 MILLIGRAM(S): 80 TABLET, CHEWABLE ORAL at 20:49

## 2019-06-23 RX ADMIN — Medication 1 APPLICATION(S): at 15:05

## 2019-06-23 RX ADMIN — ENOXAPARIN SODIUM 40 MILLIGRAM(S): 100 INJECTION SUBCUTANEOUS at 11:56

## 2019-06-23 RX ADMIN — Medication 100 MILLIGRAM(S): at 06:07

## 2019-06-23 RX ADMIN — Medication 975 MILLIGRAM(S): at 20:50

## 2019-06-23 RX ADMIN — SIMETHICONE 80 MILLIGRAM(S): 80 TABLET, CHEWABLE ORAL at 15:05

## 2019-06-23 RX ADMIN — Medication 600 MILLIGRAM(S): at 06:06

## 2019-06-23 RX ADMIN — Medication 975 MILLIGRAM(S): at 03:05

## 2019-06-23 RX ADMIN — Medication 1 APPLICATION(S): at 20:50

## 2019-06-23 RX ADMIN — Medication 975 MILLIGRAM(S): at 15:06

## 2019-06-24 ENCOUNTER — TRANSCRIPTION ENCOUNTER (OUTPATIENT)
Age: 44
End: 2019-06-24

## 2019-06-24 RX ORDER — ACETAMINOPHEN 500 MG
3 TABLET ORAL
Qty: 180 | Refills: 0
Start: 2019-06-24 | End: 2019-07-08

## 2019-06-24 RX ORDER — IBUPROFEN 200 MG
1 TABLET ORAL
Qty: 120 | Refills: 0
Start: 2019-06-24 | End: 2019-07-23

## 2019-06-24 RX ORDER — KETOROLAC TROMETHAMINE 30 MG/ML
30 SYRINGE (ML) INJECTION ONCE
Refills: 0 | Status: DISCONTINUED | OUTPATIENT
Start: 2019-06-24 | End: 2019-06-24

## 2019-06-24 RX ORDER — DOCUSATE SODIUM 100 MG
1 CAPSULE ORAL
Qty: 60 | Refills: 0
Start: 2019-06-24 | End: 2019-07-23

## 2019-06-24 RX ORDER — FERROUS SULFATE 325(65) MG
1 TABLET ORAL
Qty: 60 | Refills: 2
Start: 2019-06-24 | End: 2019-09-21

## 2019-06-24 RX ORDER — SIMETHICONE 80 MG/1
1 TABLET, CHEWABLE ORAL
Qty: 90 | Refills: 0
Start: 2019-06-24 | End: 2019-07-23

## 2019-06-24 RX ADMIN — SIMETHICONE 80 MILLIGRAM(S): 80 TABLET, CHEWABLE ORAL at 06:02

## 2019-06-24 RX ADMIN — ENOXAPARIN SODIUM 40 MILLIGRAM(S): 100 INJECTION SUBCUTANEOUS at 11:59

## 2019-06-24 RX ADMIN — SIMETHICONE 80 MILLIGRAM(S): 80 TABLET, CHEWABLE ORAL at 21:40

## 2019-06-24 RX ADMIN — Medication 1 APPLICATION(S): at 06:01

## 2019-06-24 RX ADMIN — Medication 60 MILLIGRAM(S): at 06:00

## 2019-06-24 RX ADMIN — Medication 1 APPLICATION(S): at 13:34

## 2019-06-24 RX ADMIN — Medication 975 MILLIGRAM(S): at 15:06

## 2019-06-24 RX ADMIN — Medication 600 MILLIGRAM(S): at 06:57

## 2019-06-24 RX ADMIN — Medication 100 MILLIGRAM(S): at 18:13

## 2019-06-24 RX ADMIN — Medication 600 MILLIGRAM(S): at 06:01

## 2019-06-24 RX ADMIN — Medication 975 MILLIGRAM(S): at 08:12

## 2019-06-24 RX ADMIN — Medication 30 MILLIGRAM(S): at 11:56

## 2019-06-24 RX ADMIN — Medication 975 MILLIGRAM(S): at 20:45

## 2019-06-24 RX ADMIN — Medication 100 MILLIGRAM(S): at 06:00

## 2019-06-24 RX ADMIN — Medication 1 APPLICATION(S): at 21:38

## 2019-06-24 NOTE — PROGRESS NOTE ADULT - SUBJECTIVE AND OBJECTIVE BOX
ABDIRIZAK VILLARREAL  43y  Female    PGY3 Note:    Pt recovering well, no acute complaints. Denies headache, blurred vision, RUQ/epigastric pain, new onset swelling.     Ambulating: Yes  Voiding: Yes  Flatus: Yes  Bowel movements: Yes   Breast or bottle feeding: Breast feeding   Diet: Regular    MEDICATIONS  (STANDING):  acetaminophen   Tablet .. 975 milliGRAM(s) Oral <User Schedule>  docusate sodium 100 milliGRAM(s) Oral two times a day  enoxaparin Injectable 40 milliGRAM(s) SubCutaneous daily  hydrocortisone 1% Cream 1 Application(s) Topical three times a day  ibuprofen  Tablet. 600 milliGRAM(s) Oral every 6 hours  lactated ringers Bolus 500 milliLiter(s) IV Bolus once  measles/mumps/rubella Vaccine 0.5 milliLiter(s) SubCutaneous once  NIFEdipine XL 60 milliGRAM(s) Oral every 24 hours  NIFEdipine XL 60 milliGRAM(s) Oral daily  oxytocin Infusion 1 milliUNIT(s)/Min (1 mL/Hr) IV Continuous <Continuous>  oxytocin Infusion 41.667 milliUNIT(s)/Min (125 mL/Hr) IV Continuous <Continuous>  simethicone 80 milliGRAM(s) Chew three times a day    MEDICATIONS  (PRN):  dexamethasone  Injectable 4 milliGRAM(s) IV Push every 6 hours PRN Nausea  diphenhydrAMINE 25 milliGRAM(s) Oral every 6 hours PRN Itching  glycerin Suppository - Adult 1 Suppository(s) Rectal at bedtime PRN Constipation  lanolin Ointment 1 Application(s) Topical every 6 hours PRN Sore Nipples  magnesium hydroxide Suspension 30 milliLiter(s) Oral two times a day PRN Constipation  naloxone Injectable 0.1 milliGRAM(s) IV Push every 3 minutes PRN For ANY of the following changes in patient status:  A. Breaths Per Minute LESS THAN 10, B. Oxygen saturation LESS THAN 90%, C. Sedation score of 6 for Stop After: 4 Times  ondansetron Injectable 4 milliGRAM(s) IV Push every 6 hours PRN Nausea  oxyCODONE    IR 5 milliGRAM(s) Oral every 3 hours PRN Moderate to Severe Pain (4-10)  oxyCODONE    IR 5 milliGRAM(s) Oral once PRN Moderate to Severe Pain (4-10)      PAST MEDICAL & SURGICAL HISTORY:  No pertinent past medical history  H/O ovarian cystectomy: laparoscopic  H/O laparoscopy: for liver cyst removal      Physical Exam  Vital Signs Last 24 Hrs  T(C): 36.6 (24 Jun 2019 03:18), Max: 37.1 (23 Jun 2019 17:11)  T(F): 97.8 (24 Jun 2019 03:18), Max: 98.8 (23 Jun 2019 17:11)  HR: 70 (24 Jun 2019 03:18) (58 - 70)  BP: 144/71 (24 Jun 2019 03:18) (144/71 - 164/82)  RR: 18 (24 Jun 2019 03:18) (18 - 18)    Gen: AAOx3, NAD  Fundus: firm, below umbilicus   Wound: steris in place c/d/i   Abd: Soft, nontender, nondistended, BS+  Lochia: minimal   Ext: No calf tenderness, no swelling    Labs:                        8.6    11.58 )-----------( 180      ( 23 Jun 2019 11:08 )             26.9                         9.5    21.05 )-----------( 186      ( 21 Jun 2019 05:00 )             29.7

## 2019-06-24 NOTE — DISCHARGE NOTE OB - HOSPITAL COURSE
HPI:  44 y/o  at 32w6d with EDC 8/3/19, IVF pregnancy with donor egg (donor's year of birth ) with partner's sperm, 5 day transfer, presents to the hospital for elevated BP. Patient reports normal blood pressures prior to the pregnancy although in a 14 week visit at the high risk clinic she had an elevated BP of 140/71. Patient measured her BPs at home in the past several days "because it was hot". The BPs she measured were 130s-140s/80s. Today at 0100 patient woke up because her phone rang, measured a BP of 150s/80s. Denies headache, blurry vision, chest pain, SOB, epigastric pain. Reports increased swelling in bilateral upper and lower extremities and the face in the past 2 weeks. Gained 5 lbs in the past 2 weeks. Reports irregular contractions. Denies vaginal bleeding and LOF. Feels good fetal movements. Patient is AMA, had increased risk of trisomy 21 on sequential I of 1:12. Patient went for consult with high risk and was offered NIPS vs diagnostic testing (CVS vs amniocentesis). Patient opted for NIPS which came back as low risk for aneuploidy and did not want to have diagnostic testing. No other complications during this pregnancy.  Patient observed in labor and delivery overnight and now meets criteria for gestational hypertension. Had 3 isolated severe BPs but BPs are labile. (2019 09:29)    Met criteria for severe gestational hypertension, not controlled by antihypertensive medication. Hence; decision made for delivery. S/P celestone x2 doses; primary  section for cat II tracing remote from delivery. Pt put on Mg for 24hr postpartum for seizure prophylaxis. Postop, did well, H/H stable, voiding, ambulating, tolerating regular diet; antihypertensive medication adjusted to control BPs prior to d/c home. HPI:  42 y/o  at 32w6d with EDC 8/3/19, IVF pregnancy with donor egg (donor's year of birth ) with partner's sperm, 5 day transfer, presents to the hospital for elevated BP. Patient reports normal blood pressures prior to the pregnancy although in a 14 week visit at the high risk clinic she had an elevated BP of 140/71. Patient measured her BPs at home in the past several days "because it was hot". The BPs she measured were 130s-140s/80s. Today at 0100 patient woke up because her phone rang, measured a BP of 150s/80s. Denies headache, blurry vision, chest pain, SOB, epigastric pain. Reports increased swelling in bilateral upper and lower extremities and the face in the past 2 weeks. Gained 5 lbs in the past 2 weeks. Reports irregular contractions. Denies vaginal bleeding and LOF. Feels good fetal movements. Patient is AMA, had increased risk of trisomy 21 on sequential I of 1:12. Patient went for consult with high risk and was offered NIPS vs diagnostic testing (CVS vs amniocentesis). Patient opted for NIPS which came back as low risk for aneuploidy and did not want to have diagnostic testing. No other complications during this pregnancy.  Patient observed in labor and delivery overnight and now meets criteria for gestational hypertension. Had 3 isolated severe BPs but BPs are labile. (2019 09:29)    Met criteria for severe gestational hypertension, not controlled by antihypertensive medication. Hence; decision made for delivery. S/P celestone x2 doses; primary  section for cat II tracing remote from delivery. Pt put on Mg for 24hr postpartum for seizure prophylaxis. Postop, did well, H/H stable, voiding, ambulating, tolerating regular diet; antihypertensive medication adjusted to control BPs prior to d/c home; 60xl procardia.

## 2019-06-24 NOTE — DISCHARGE NOTE OB - MEDICATION SUMMARY - MEDICATIONS TO TAKE
I will START or STAY ON the medications listed below when I get home from the hospital:    acetaminophen 325 mg oral tablet  -- 3 tab(s) by mouth every 6 hours   -- Indication: For Pain as needed    ibuprofen 600 mg oral tablet  -- 1 tab(s) by mouth every 6 hours  -- Indication: For Pain as needed    ferrous sulfate 200 mg (65 mg elemental iron) oral tablet  -- 1 cap(s) by mouth 2 times a day   -- May discolor urine or feces.    -- Indication: For anemia    docusate sodium 100 mg oral capsule  -- 1 cap(s) by mouth 2 times a day  -- Indication: For constipation as needed    simethicone 80 mg oral tablet, chewable  -- 1 tab(s) by mouth 3 times a day  -- Indication: For gas pain as needed I will START or STAY ON the medications listed below when I get home from the hospital:    acetaminophen 325 mg oral tablet  -- 3 tab(s) by mouth every 6 hours   -- Indication: For Pain as needed    ibuprofen 600 mg oral tablet  -- 1 tab(s) by mouth every 6 hours  -- Indication: For Pain as needed    Procardia XL 60 mg oral tablet, extended release  -- 1 tab(s) by mouth once a day   -- Avoid grapefruit and grapefruit juice while taking this medication.  It is very important that you take or use this exactly as directed.  Do not skip doses or discontinue unless directed by your doctor.  Some non-prescription drugs may aggravate your condition.  Read all labels carefully.  If a warning appears, check with your doctor before taking.  Swallow whole.  Do not crush.    -- Indication: For gestational hypertension    ferrous sulfate 200 mg (65 mg elemental iron) oral tablet  -- 1 cap(s) by mouth 2 times a day   -- May discolor urine or feces.    -- Indication: For anemia    docusate sodium 100 mg oral capsule  -- 1 cap(s) by mouth 2 times a day  -- Indication: For constipation as needed    simethicone 80 mg oral tablet, chewable  -- 1 tab(s) by mouth 3 times a day  -- Indication: For gas pain as needed

## 2019-06-24 NOTE — PROGRESS NOTE ADULT - ASSESSMENT
S/P primary C/S for Cat II tracing remote from delivery, ex33.4w, S/P celestone X2 doses, S/P Mg for seizure prophylaxis, severe gHTN, currently recovering well  - procardia increased to 60xl qD   - ambulation/PO hydration  - monitor vitals, bleeding  - if BPs well controlled; consider d/c home later today  - f/u in 1 week for incision check/BP check and 6 weeks for postpartum visit     Dr. Tovar to be made aware. S/P primary C/S for Cat II tracing remote from delivery, ex33.4w, S/P celestone X2 doses, S/P Mg for seizure prophylaxis, severe gHTN, POD4, currently recovering well  - procardia increased to 60xl qD   - ambulation/PO hydration  - monitor vitals, bleeding  - if BPs well controlled; consider d/c home later today  - f/u in 1 week for incision check/BP check and 6 weeks for postpartum visit     Dr. Tovar to be made aware.

## 2019-06-24 NOTE — DISCHARGE NOTE OB - CARE PLAN
Principal Discharge DX:	Delivery by  section of full-term infant  Goal:	healthy mother and baby  Assessment and plan of treatment:	Nothing in the vagina for 6 weeks (no sex, no tampons, no douching, no swimming pools). Avoid tub baths, you may shower.  Please keep your incision clean and dry.   If you have a fever of 100.4F or greater, severe vaginal bleeding, or severe abdominal pain, call your Ob/Gyn or come to the emergency department immediately.  Secondary Diagnosis:	Preeclampsia, severe  Goal:	normal BPs  Assessment and plan of treatment:	Please inform if headache, blurred vision.   Please take your blood pressure medication as directed.

## 2019-06-24 NOTE — DISCHARGE NOTE OB - PATIENT PORTAL LINK FT
You can access the [facility] Portal, offered by [facility], by registering with the following website: [email or web address]

## 2019-06-24 NOTE — CHART NOTE - NSCHARTNOTEFT_GEN_A_CORE
Patient had been complaining of temporal headache since 10 AM.  She received Tylenol in the AM after onset and also Toradol at noon.  Patient reported partial improvement after medication.  At 4 PM, patient took a home medication from [place] called Nimesulide which she has used in the past for a history of migraines.  She was then given ibuprofen at 6 PM because it was not known she had taken her home medication at 4PM.  Patient currently reports improvement in headache.  BPs normal today, last BP at 1600 was 129/64.    Dr. Villanueva made aware.

## 2019-06-24 NOTE — DISCHARGE NOTE OB - CARE PROVIDER_API CALL
Luis Fernando Tovar)  Obstetrics and Gynecology  [address] [place] [id number]  Phone: [phone]  Fax: [phone]  Follow Up Time: 1 week

## 2019-06-24 NOTE — DISCHARGE NOTE OB - PLAN OF CARE
healthy mother and baby Nothing in the vagina for 6 weeks (no sex, no tampons, no douching, no swimming pools). Avoid tub baths, you may shower.  Please keep your incision clean and dry.   If you have a fever of 100.4F or greater, severe vaginal bleeding, or severe abdominal pain, call your Ob/Gyn or come to the emergency department immediately. normal BPs Please inform if headache, blurred vision.   Please take your blood pressure medication as directed.

## 2019-06-25 VITALS
RESPIRATION RATE: 18 BRPM | DIASTOLIC BLOOD PRESSURE: 67 MMHG | SYSTOLIC BLOOD PRESSURE: 133 MMHG | TEMPERATURE: 97 F | HEART RATE: 79 BPM

## 2019-06-25 LAB — SURGICAL PATHOLOGY STUDY: SIGNIFICANT CHANGE UP

## 2019-06-25 RX ORDER — NIFEDIPINE 30 MG
1 TABLET, EXTENDED RELEASE 24 HR ORAL
Qty: 30 | Refills: 0
Start: 2019-06-25 | End: 2019-07-24

## 2019-06-25 RX ADMIN — Medication 1 APPLICATION(S): at 06:20

## 2019-06-25 RX ADMIN — Medication 0.5 MILLILITER(S): at 14:26

## 2019-06-25 RX ADMIN — Medication 60 MILLIGRAM(S): at 06:19

## 2019-06-25 RX ADMIN — Medication 975 MILLIGRAM(S): at 08:29

## 2019-06-25 RX ADMIN — Medication 100 MILLIGRAM(S): at 06:19

## 2019-06-25 RX ADMIN — ENOXAPARIN SODIUM 40 MILLIGRAM(S): 100 INJECTION SUBCUTANEOUS at 11:54

## 2019-06-25 RX ADMIN — SIMETHICONE 80 MILLIGRAM(S): 80 TABLET, CHEWABLE ORAL at 06:20

## 2019-06-25 RX ADMIN — Medication 1 APPLICATION(S): at 14:33

## 2019-06-25 NOTE — PROGRESS NOTE ADULT - ASSESSMENT
S/P primary C/S for Cat II tracing remote from delivery, ex33.4w, S/P celestone X2 doses, S/P Mg for seizure prophylaxis, severe gHTN, POD5, currently recovering well  - c/w procardia 60xl qD   - ambulation/PO hydration  - monitor vitals, bleeding  - d/c home   - f/u in 1 week for incision check/BP check and 6 weeks for postpartum visit     Dr. Tovar to be made aware.

## 2019-06-25 NOTE — PROGRESS NOTE ADULT - SUBJECTIVE AND OBJECTIVE BOX
ABDIRIZAK VILLARREAL  43y  Female    PGY3 Note:    Pt recovering well, no acute complaints. Denies headache, blurred vision, RUQ/epigastric pain, new onset swelling.     Ambulating: Yes  Voiding: Yes  Flatus: Yes  Bowel movements: Yes   Breast or bottle feeding: Breast feeding   Diet: Regular    MEDICATIONS  (STANDING):  acetaminophen   Tablet .. 975 milliGRAM(s) Oral <User Schedule>  docusate sodium 100 milliGRAM(s) Oral two times a day  enoxaparin Injectable 40 milliGRAM(s) SubCutaneous daily  hydrocortisone 1% Cream 1 Application(s) Topical three times a day  ibuprofen  Tablet. 600 milliGRAM(s) Oral every 6 hours  lactated ringers Bolus 500 milliLiter(s) IV Bolus once  measles/mumps/rubella Vaccine 0.5 milliLiter(s) SubCutaneous once  NIFEdipine XL 60 milliGRAM(s) Oral every 24 hours  NIFEdipine XL 60 milliGRAM(s) Oral daily  oxytocin Infusion 1 milliUNIT(s)/Min (1 mL/Hr) IV Continuous <Continuous>  oxytocin Infusion 41.667 milliUNIT(s)/Min (125 mL/Hr) IV Continuous <Continuous>  simethicone 80 milliGRAM(s) Chew three times a day    MEDICATIONS  (PRN):  dexamethasone  Injectable 4 milliGRAM(s) IV Push every 6 hours PRN Nausea  diphenhydrAMINE 25 milliGRAM(s) Oral every 6 hours PRN Itching  glycerin Suppository - Adult 1 Suppository(s) Rectal at bedtime PRN Constipation  lanolin Ointment 1 Application(s) Topical every 6 hours PRN Sore Nipples  magnesium hydroxide Suspension 30 milliLiter(s) Oral two times a day PRN Constipation  naloxone Injectable 0.1 milliGRAM(s) IV Push every 3 minutes PRN For ANY of the following changes in patient status:  A. Breaths Per Minute LESS THAN 10, B. Oxygen saturation LESS THAN 90%, C. Sedation score of 6 for Stop After: 4 Times  ondansetron Injectable 4 milliGRAM(s) IV Push every 6 hours PRN Nausea  oxyCODONE    IR 5 milliGRAM(s) Oral every 3 hours PRN Moderate to Severe Pain (4-10)  oxyCODONE    IR 5 milliGRAM(s) Oral once PRN Moderate to Severe Pain (4-10)      PAST MEDICAL & SURGICAL HISTORY:  No pertinent past medical history  H/O ovarian cystectomy: laparoscopic  H/O laparoscopy: for liver cyst removal      Physical Exam  Vital Signs Last 24 Hrs  T(C): 36.8 (24 Jun 2019 23:05), Max: 36.8 (24 Jun 2019 23:05)  T(F): 98.2 (24 Jun 2019 23:05), Max: 98.2 (24 Jun 2019 23:05)  HR: 92 (24 Jun 2019 23:05) (73 - 95)  BP: 122/68 (25 Jun 2019 03:02) (122/68 - 142/74)  RR: 20 (24 Jun 2019 23:05) (18 - 20)    Gen: AAOx3, NAD  Fundus: firm, below umbilicus   Wound: steris in place c/d/i   Abd: Soft, nontender, nondistended, BS+  Lochia: minimal   Ext: No calf tenderness, no swelling    Labs:                        8.6    11.58 )-----------( 180      ( 23 Jun 2019 11:08 )             26.9                         9.5    21.05 )-----------( 186      ( 21 Jun 2019 05:00 )             29.7

## 2019-06-25 NOTE — PROGRESS NOTE ADULT - PROVIDER SPECIALTY LIST ADULT
MFPAIGE
OB

## 2019-07-01 DIAGNOSIS — Z3A.32 32 WEEKS GESTATION OF PREGNANCY: ICD-10-CM

## 2019-07-01 DIAGNOSIS — A60.04 HERPESVIRAL VULVOVAGINITIS: ICD-10-CM

## 2019-07-01 DIAGNOSIS — O99.89 OTHER SPECIFIED DISEASES AND CONDITIONS COMPLICATING PREGNANCY, CHILDBIRTH AND THE PUERPERIUM: ICD-10-CM

## 2019-07-01 DIAGNOSIS — R51 HEADACHE: ICD-10-CM

## 2019-07-01 DIAGNOSIS — Z79.899 OTHER LONG TERM (CURRENT) DRUG THERAPY: ICD-10-CM

## 2021-02-04 PROBLEM — Z78.9 OTHER SPECIFIED HEALTH STATUS: Chronic | Status: ACTIVE | Noted: 2019-06-14

## 2021-02-12 ENCOUNTER — OUTPATIENT (OUTPATIENT)
Dept: OUTPATIENT SERVICES | Facility: HOSPITAL | Age: 46
LOS: 1 days | Discharge: HOME | End: 2021-02-12
Payer: COMMERCIAL

## 2021-02-12 DIAGNOSIS — Z98.890 OTHER SPECIFIED POSTPROCEDURAL STATES: Chronic | ICD-10-CM

## 2021-02-12 DIAGNOSIS — E04.1 NONTOXIC SINGLE THYROID NODULE: ICD-10-CM

## 2021-02-12 PROCEDURE — 76536 US EXAM OF HEAD AND NECK: CPT | Mod: 26

## 2021-04-27 ENCOUNTER — OUTPATIENT (OUTPATIENT)
Dept: OUTPATIENT SERVICES | Facility: HOSPITAL | Age: 46
LOS: 1 days | Discharge: HOME | End: 2021-04-27
Payer: COMMERCIAL

## 2021-04-27 DIAGNOSIS — Z98.890 OTHER SPECIFIED POSTPROCEDURAL STATES: Chronic | ICD-10-CM

## 2021-04-27 DIAGNOSIS — Z12.31 ENCOUNTER FOR SCREENING MAMMOGRAM FOR MALIGNANT NEOPLASM OF BREAST: ICD-10-CM

## 2021-04-27 PROCEDURE — 77063 BREAST TOMOSYNTHESIS BI: CPT | Mod: 26

## 2021-04-27 PROCEDURE — 77067 SCR MAMMO BI INCL CAD: CPT | Mod: 26

## 2021-05-05 ENCOUNTER — OUTPATIENT (OUTPATIENT)
Dept: OUTPATIENT SERVICES | Facility: HOSPITAL | Age: 46
LOS: 1 days | Discharge: HOME | End: 2021-05-05
Payer: COMMERCIAL

## 2021-05-05 DIAGNOSIS — Z98.890 OTHER SPECIFIED POSTPROCEDURAL STATES: Chronic | ICD-10-CM

## 2021-05-05 DIAGNOSIS — R92.8 OTHER ABNORMAL AND INCONCLUSIVE FINDINGS ON DIAGNOSTIC IMAGING OF BREAST: ICD-10-CM

## 2021-05-05 PROCEDURE — 77066 DX MAMMO INCL CAD BI: CPT | Mod: 26

## 2021-05-05 PROCEDURE — G0279: CPT | Mod: 26

## 2021-05-11 ENCOUNTER — RESULT REVIEW (OUTPATIENT)
Age: 46
End: 2021-05-11

## 2021-05-11 ENCOUNTER — OUTPATIENT (OUTPATIENT)
Dept: OUTPATIENT SERVICES | Facility: HOSPITAL | Age: 46
LOS: 1 days | Discharge: HOME | End: 2021-05-11
Payer: COMMERCIAL

## 2021-05-11 DIAGNOSIS — Z98.890 OTHER SPECIFIED POSTPROCEDURAL STATES: Chronic | ICD-10-CM

## 2021-05-11 PROCEDURE — 19081 BX BREAST 1ST LESION STRTCTC: CPT | Mod: RT

## 2021-05-11 PROCEDURE — 88341 IMHCHEM/IMCYTCHM EA ADD ANTB: CPT | Mod: 26

## 2021-05-11 PROCEDURE — 88305 TISSUE EXAM BY PATHOLOGIST: CPT | Mod: 26

## 2021-05-11 PROCEDURE — 88342 IMHCHEM/IMCYTCHM 1ST ANTB: CPT | Mod: 26

## 2021-05-12 LAB — SURGICAL PATHOLOGY STUDY: SIGNIFICANT CHANGE UP

## 2021-05-23 DIAGNOSIS — R92.8 OTHER ABNORMAL AND INCONCLUSIVE FINDINGS ON DIAGNOSTIC IMAGING OF BREAST: ICD-10-CM

## 2021-05-23 DIAGNOSIS — N60.21 FIBROADENOSIS OF RIGHT BREAST: ICD-10-CM

## 2021-05-23 DIAGNOSIS — N60.31 FIBROSCLEROSIS OF RIGHT BREAST: ICD-10-CM

## 2022-03-19 ENCOUNTER — OUTPATIENT (OUTPATIENT)
Dept: OUTPATIENT SERVICES | Facility: HOSPITAL | Age: 47
LOS: 1 days | Discharge: HOME | End: 2022-03-19
Payer: COMMERCIAL

## 2022-03-19 DIAGNOSIS — E04.1 NONTOXIC SINGLE THYROID NODULE: ICD-10-CM

## 2022-03-19 DIAGNOSIS — Z98.890 OTHER SPECIFIED POSTPROCEDURAL STATES: Chronic | ICD-10-CM

## 2022-03-19 PROCEDURE — 76700 US EXAM ABDOM COMPLETE: CPT | Mod: 26

## 2022-03-19 PROCEDURE — 76536 US EXAM OF HEAD AND NECK: CPT | Mod: 26

## 2022-03-21 ENCOUNTER — OUTPATIENT (OUTPATIENT)
Dept: OUTPATIENT SERVICES | Facility: HOSPITAL | Age: 47
LOS: 1 days | Discharge: HOME | End: 2022-03-21
Payer: COMMERCIAL

## 2022-03-21 DIAGNOSIS — N64.4 MASTODYNIA: ICD-10-CM

## 2022-03-21 DIAGNOSIS — Z98.890 OTHER SPECIFIED POSTPROCEDURAL STATES: Chronic | ICD-10-CM

## 2022-03-21 PROCEDURE — G0279: CPT | Mod: 26

## 2022-03-21 PROCEDURE — 76641 ULTRASOUND BREAST COMPLETE: CPT | Mod: 26,50

## 2022-03-21 PROCEDURE — 77066 DX MAMMO INCL CAD BI: CPT | Mod: 26

## 2022-08-30 ENCOUNTER — APPOINTMENT (OUTPATIENT)
Dept: DERMATOLOGY | Facility: CLINIC | Age: 47
End: 2022-08-30

## 2023-03-30 ENCOUNTER — OUTPATIENT (OUTPATIENT)
Dept: OUTPATIENT SERVICES | Facility: HOSPITAL | Age: 48
LOS: 1 days | End: 2023-03-30
Payer: COMMERCIAL

## 2023-03-30 DIAGNOSIS — Z98.890 OTHER SPECIFIED POSTPROCEDURAL STATES: Chronic | ICD-10-CM

## 2023-03-30 DIAGNOSIS — Z12.31 ENCOUNTER FOR SCREENING MAMMOGRAM FOR MALIGNANT NEOPLASM OF BREAST: ICD-10-CM

## 2023-03-30 DIAGNOSIS — Z00.8 ENCOUNTER FOR OTHER GENERAL EXAMINATION: ICD-10-CM

## 2023-03-30 PROCEDURE — 77063 BREAST TOMOSYNTHESIS BI: CPT

## 2023-03-30 PROCEDURE — 77063 BREAST TOMOSYNTHESIS BI: CPT | Mod: 26

## 2023-03-30 PROCEDURE — 77067 SCR MAMMO BI INCL CAD: CPT | Mod: 26

## 2023-03-30 PROCEDURE — 77067 SCR MAMMO BI INCL CAD: CPT

## 2023-03-31 DIAGNOSIS — Z12.31 ENCOUNTER FOR SCREENING MAMMOGRAM FOR MALIGNANT NEOPLASM OF BREAST: ICD-10-CM

## 2023-12-21 ENCOUNTER — OUTPATIENT (OUTPATIENT)
Dept: OUTPATIENT SERVICES | Facility: HOSPITAL | Age: 48
LOS: 1 days | End: 2023-12-21
Payer: COMMERCIAL

## 2023-12-21 DIAGNOSIS — R05.3 CHRONIC COUGH: ICD-10-CM

## 2023-12-21 DIAGNOSIS — Z98.890 OTHER SPECIFIED POSTPROCEDURAL STATES: Chronic | ICD-10-CM

## 2023-12-21 PROCEDURE — 71046 X-RAY EXAM CHEST 2 VIEWS: CPT | Mod: 26

## 2023-12-21 PROCEDURE — 71046 X-RAY EXAM CHEST 2 VIEWS: CPT

## 2023-12-22 DIAGNOSIS — R05.3 CHRONIC COUGH: ICD-10-CM

## 2024-04-03 ENCOUNTER — OUTPATIENT (OUTPATIENT)
Dept: OUTPATIENT SERVICES | Facility: HOSPITAL | Age: 49
LOS: 1 days | End: 2024-04-03
Payer: COMMERCIAL

## 2024-04-03 DIAGNOSIS — Z98.890 OTHER SPECIFIED POSTPROCEDURAL STATES: Chronic | ICD-10-CM

## 2024-04-03 DIAGNOSIS — Z12.31 ENCOUNTER FOR SCREENING MAMMOGRAM FOR MALIGNANT NEOPLASM OF BREAST: ICD-10-CM

## 2024-04-03 PROCEDURE — 77067 SCR MAMMO BI INCL CAD: CPT | Mod: 26

## 2024-04-03 PROCEDURE — 77063 BREAST TOMOSYNTHESIS BI: CPT

## 2024-04-03 PROCEDURE — 77067 SCR MAMMO BI INCL CAD: CPT

## 2024-04-03 PROCEDURE — 77063 BREAST TOMOSYNTHESIS BI: CPT | Mod: 26

## 2024-04-04 DIAGNOSIS — Z12.31 ENCOUNTER FOR SCREENING MAMMOGRAM FOR MALIGNANT NEOPLASM OF BREAST: ICD-10-CM

## 2025-03-22 ENCOUNTER — OUTPATIENT (OUTPATIENT)
Dept: OUTPATIENT SERVICES | Facility: HOSPITAL | Age: 50
LOS: 1 days | End: 2025-03-22
Payer: COMMERCIAL

## 2025-03-22 DIAGNOSIS — J32.9 CHRONIC SINUSITIS, UNSPECIFIED: ICD-10-CM

## 2025-03-22 DIAGNOSIS — Z98.890 OTHER SPECIFIED POSTPROCEDURAL STATES: Chronic | ICD-10-CM

## 2025-03-22 DIAGNOSIS — Z00.8 ENCOUNTER FOR OTHER GENERAL EXAMINATION: ICD-10-CM

## 2025-03-22 PROCEDURE — 70486 CT MAXILLOFACIAL W/O DYE: CPT | Mod: 26

## 2025-03-22 PROCEDURE — 70486 CT MAXILLOFACIAL W/O DYE: CPT

## 2025-03-23 DIAGNOSIS — J32.9 CHRONIC SINUSITIS, UNSPECIFIED: ICD-10-CM

## 2025-04-03 ENCOUNTER — OUTPATIENT (OUTPATIENT)
Dept: OUTPATIENT SERVICES | Facility: HOSPITAL | Age: 50
LOS: 1 days | End: 2025-04-03
Payer: COMMERCIAL

## 2025-04-03 DIAGNOSIS — Z12.31 ENCOUNTER FOR SCREENING MAMMOGRAM FOR MALIGNANT NEOPLASM OF BREAST: ICD-10-CM

## 2025-04-03 DIAGNOSIS — Z98.890 OTHER SPECIFIED POSTPROCEDURAL STATES: Chronic | ICD-10-CM

## 2025-04-03 DIAGNOSIS — Z00.00 ENCOUNTER FOR GENERAL ADULT MEDICAL EXAMINATION WITHOUT ABNORMAL FINDINGS: ICD-10-CM

## 2025-04-03 PROCEDURE — 77063 BREAST TOMOSYNTHESIS BI: CPT

## 2025-04-03 PROCEDURE — 77067 SCR MAMMO BI INCL CAD: CPT

## 2025-04-03 PROCEDURE — 77067 SCR MAMMO BI INCL CAD: CPT | Mod: 26

## 2025-04-03 PROCEDURE — 77063 BREAST TOMOSYNTHESIS BI: CPT | Mod: 26

## 2025-04-04 DIAGNOSIS — Z12.31 ENCOUNTER FOR SCREENING MAMMOGRAM FOR MALIGNANT NEOPLASM OF BREAST: ICD-10-CM
